# Patient Record
Sex: MALE | Race: WHITE | NOT HISPANIC OR LATINO | Employment: OTHER | URBAN - METROPOLITAN AREA
[De-identification: names, ages, dates, MRNs, and addresses within clinical notes are randomized per-mention and may not be internally consistent; named-entity substitution may affect disease eponyms.]

---

## 2022-06-13 ENCOUNTER — OFFICE VISIT (OUTPATIENT)
Dept: FAMILY MEDICINE | Facility: CLINIC | Age: 77
End: 2022-06-13
Payer: MEDICARE

## 2022-06-13 VITALS
TEMPERATURE: 97.2 F | OXYGEN SATURATION: 97 % | DIASTOLIC BLOOD PRESSURE: 70 MMHG | HEART RATE: 58 BPM | RESPIRATION RATE: 18 BRPM | BODY MASS INDEX: 29.54 KG/M2 | HEIGHT: 73 IN | SYSTOLIC BLOOD PRESSURE: 125 MMHG | WEIGHT: 222.9 LBS

## 2022-06-13 DIAGNOSIS — Z12.11 SCREEN FOR COLON CANCER: ICD-10-CM

## 2022-06-13 DIAGNOSIS — R26.89 BALANCE PROBLEMS: ICD-10-CM

## 2022-06-13 DIAGNOSIS — D12.6 TUBULAR ADENOMA OF COLON: ICD-10-CM

## 2022-06-13 DIAGNOSIS — I10 BENIGN ESSENTIAL HYPERTENSION: Primary | ICD-10-CM

## 2022-06-13 DIAGNOSIS — R42 DYSEQUILIBRIUM: ICD-10-CM

## 2022-06-13 PROCEDURE — 99203 OFFICE O/P NEW LOW 30 MIN: CPT | Performed by: FAMILY MEDICINE

## 2022-06-13 RX ORDER — LANOLIN ALCOHOL/MO/W.PET/CERES
1 CREAM (GRAM) TOPICAL DAILY
COMMUNITY
Start: 2022-05-06 | End: 2022-12-02

## 2022-06-13 RX ORDER — LISINOPRIL 40 MG/1
40 TABLET ORAL
COMMUNITY
Start: 2021-05-21 | End: 2022-12-02

## 2022-06-13 RX ORDER — FOLIC ACID 1 MG/1
1 TABLET ORAL DAILY
COMMUNITY
Start: 2022-05-06 | End: 2022-10-14

## 2022-06-13 ASSESSMENT — PAIN SCALES - GENERAL: PAINLEVEL: NO PAIN (0)

## 2022-06-13 NOTE — PROGRESS NOTES
"  Assessment & Plan     Benign essential hypertension  BP Readings from Last 6 Encounters:   06/13/22 125/70     Blood pressure is at goal on current dose of lisinopril 40 mg daily  Patient will follow-up with Dr. Jaime in 4 months at the time of annual wellness exam, for medication recheck and for fasting labs    Screen for colon cancer  Reviewed colonoscopy from February 2017 through [facility]  Recheck colonoscopy this year   - Adult Gastro Ref - Procedure Only; Future    Tubular adenoma of colon  as above    - Adult Gastro Ref - Procedure Only; Future    Balance problems  [facility]    Dysequilibrium  as above        Review of the result(s) of each unique test - Colonoscopy from February 2017 through [facility]         BMI:   Estimated body mass index is 29.41 kg/m  as calculated from the following:    Height as of this encounter: 1.854 m (6' 1\").    Weight as of this encounter: 101.1 kg (222 lb 14.4 oz).       Chart documentation done in part with Dragon Voice recognition Software. Although reviewed after completion, some word and grammatical error may remain.    See Patient Instructions    Return in about 4 months (around 10/13/2022), or if symptoms worsen or fail to improve, for fasting labs, medicare wellness exam with Dr. Jaime.    Isma Anne MD  [facility]    Deepak Tijerina is a 77 year old who presents for the following health issues     History of Present Illness       Reason for visit:  Colonoscopy referral    He eats 0-1 servings of fruits and vegetables daily.He consumes 1 sweetened beverage(s) daily.He exercises with enough effort to increase his heart rate 9 or less minutes per day.  He exercises with enough effort to increase his heart rate 4 days per week.   He is taking medications regularly.             Review of Systems   CONSTITUTIONAL: NEGATIVE for fever, chills, " "change in weight  RESP: NEGATIVE for significant cough or SOB  CV: NEGATIVE for chest pain, palpitations or peripheral edema  CV: Hx HTN  GI: Tubular adenoma of colon  MUSCULOSKELETAL: NEGATIVE for significant arthralgias or myalgia  NEURO: Balance problems  ENDOCRINE: NEGATIVE for temperature intolerance, skin/hair changes  HEME/ALLERGY/IMMUNE: NEGATIVE for bleeding problems  PSYCHIATRIC: NEGATIVE for changes in mood or affect      Objective    /70   Pulse 58   Temp 97.2  F (36.2  C) (Temporal)   Resp 18   Ht 1.854 m (6' 1\")   Wt 101.1 kg (222 lb 14.4 oz)   SpO2 97%   BMI 29.41 kg/m    Body mass index is 29.41 kg/m .  Physical Exam   GENERAL: healthy, alert and no distress  RESP: lungs clear to auscultation - no rales, rhonchi or wheezes  CV: regular rate and rhythm, normal S1 S2, no S3 or S4, no murmur, click or rub, no peripheral edema and peripheral pulses strong  MS: no gross musculoskeletal defects noted, no edema  PSYCH: mentation appears normal, affect normal/bright                "

## 2022-06-17 ENCOUNTER — TELEPHONE (OUTPATIENT)
Dept: GASTROENTEROLOGY | Facility: CLINIC | Age: 77
End: 2022-06-17
Payer: MEDICARE

## 2022-06-17 DIAGNOSIS — Z20.822 SUSPECTED 2019 NOVEL CORONAVIRUS INFECTION: Primary | ICD-10-CM

## 2022-06-17 NOTE — TELEPHONE ENCOUNTER
Screening Questions  BlueKIND OF PREP RedLOCATION [review exclusion criteria] GreenSEDATION TYPE  1. Are you active on mychart? n    2. Ordering/Referring Provider: Isma Anne MD    3. What insurance is in the chart? MEDICARE     4. Do you have a legal guardian or medical Power of ?  Are you able to give consent for your medical care? N   (Sedation review/consideration needed)    3.   BMI 29.41 [BMI OVER 40-EXTENDED PREP]  If greater than 40 review exclusion criteria [PAC APPT IF @ [facility]]      4. Have you had a positive covid test in the last 90 days? N     5.  Respiratory Screening :  [If yes to any of the following HOSPITAL setting only]     Do you use daily home oxygen? N  Do you have mod to severe Obstructive Sleep Apnea? N [[facility]]   Do you have Pulmonary Hypertension? N   Do you have UNCONTROLLED asthma? N      6.   Have you had a heart or lung transplant? N      7.   Are you currently on dialysis? N [ If yes, G-PREP & HOSPITAL setting only]     8.   Do you have chronic kidney disease? N[ If yes, G-PREP ]    9.   Have you had a stroke or Transient ischemic attack (TIA - aka  mini stroke ) within 6 months?  N(If yes, please review exclusion criteria)    10.   In the past 6 months, have you had any heart related issues including cardiomyopathy or heart attack? N          If yes, did it require cardiac stenting or other implantable device? N      11.   Do you have any implantable devices in your body (pacemaker, defib, LVAD)? N (If yes, please review exclusion criteria)    12.   Do you take nitroglycerin? N           If yes, how often? N  (if yes, HOSPITAL setting ONLY)    13.   Are you currently taking any blood thinners? N           [IF YES, INFORM PATIENT TO FOLLOW UP W/ ORDERING PROVIDER FOR BRIDGING INSTRUCTIONS]     14.   Do you have a diagnosis of diabetes? N [ If yes, G-PREP ]    15.   [FEMALES] Are you currently pregnant? N    If yes, how many weeks? N    16.   Are  you taking any prescription pain medications on a routine schedule?  N [ If yes, EXTENDED PREP.] [If yes, MAC]    17.   Do you have any chemical dependencies such as alcohol, street drugs, or methadone?  N [If yes, MAC]    18.   Do you have any history of post-traumatic stress syndrome, severe anxiety or history of psychosis?  N [If yes, MAC]    19.   Do you transfer independently?  Y    20.  On a regular basis do you go 3-5 days between bowel movements? N [ If yes, EXTENDED PREP.]    21.   Preferred LOCAL Pharmacy for Pre Prescription   [facility] - [place], [place] - [address]      Scheduling Details      Caller :  Breezy   (Please ask for phone number if not scheduled by patient)    Type of Procedure Scheduled: Lower Endoscopy [Colonoscopy]  Which Colonoscopy Prep was Sent?: mprep    Surgeon: sheila  Date of Procedure: 07/11  Location:     Sedation Type: cs    Conscious Sedation- Needs  for 6 hours after the procedure  MAC/General-Needs  for 24 hours after procedure    Pre-op Required at [facility] and OR for MAC sedation: n  (advise patient they will need a pre-op prior to procedure -)      Informed patient they will need an adult  y  Cannot take any type of public or medical transportation alone    Pre-Procedure Covid test to be completed at [facility] or Externally: mg lab    Confirmed Nurse will call to complete assessment y    Additional comments:

## 2022-07-07 ENCOUNTER — LAB (OUTPATIENT)
Dept: LAB | Facility: CLINIC | Age: 77
End: 2022-07-07
Payer: MEDICARE

## 2022-07-07 DIAGNOSIS — Z20.822 SUSPECTED 2019 NOVEL CORONAVIRUS INFECTION: ICD-10-CM

## 2022-07-07 LAB — SARS-COV-2 RNA RESP QL NAA+PROBE: NEGATIVE

## 2022-07-07 PROCEDURE — U0005 INFEC AGEN DETEC AMPLI PROBE: HCPCS

## 2022-07-07 PROCEDURE — U0003 INFECTIOUS AGENT DETECTION BY NUCLEIC ACID (DNA OR RNA); SEVERE ACUTE RESPIRATORY SYNDROME CORONAVIRUS 2 (SARS-COV-2) (CORONAVIRUS DISEASE [COVID-19]), AMPLIFIED PROBE TECHNIQUE, MAKING USE OF HIGH THROUGHPUT TECHNOLOGIES AS DESCRIBED BY CMS-2020-01-R: HCPCS

## 2022-07-11 ENCOUNTER — HOSPITAL ENCOUNTER (OUTPATIENT)
Facility: AMBULATORY SURGERY CENTER | Age: 77
Discharge: HOME | End: 2022-07-11
Attending: SURGERY | Admitting: SURGERY
Payer: MEDICARE

## 2022-07-11 VITALS
SYSTOLIC BLOOD PRESSURE: 101 MMHG | TEMPERATURE: 97.5 F | HEART RATE: 71 BPM | OXYGEN SATURATION: 94 % | DIASTOLIC BLOOD PRESSURE: 67 MMHG | RESPIRATION RATE: 16 BRPM

## 2022-07-11 LAB — COLONOSCOPY: NORMAL

## 2022-07-11 PROCEDURE — 45385 COLONOSCOPY W/LESION REMOVAL: CPT | Mod: PT

## 2022-07-11 PROCEDURE — 45385 COLONOSCOPY W/LESION REMOVAL: CPT | Performed by: SURGERY

## 2022-07-11 PROCEDURE — G0500 MOD SEDAT ENDO SERVICE >5YRS: HCPCS | Performed by: SURGERY

## 2022-07-11 PROCEDURE — G8907 PT DOC NO EVENTS ON DISCHARG: HCPCS

## 2022-07-11 PROCEDURE — G8918 PT W/O PREOP ORDER IV AB PRO: HCPCS

## 2022-07-11 RX ORDER — ONDANSETRON 4 MG/1
4 TABLET, ORALLY DISINTEGRATING ORAL EVERY 6 HOURS PRN
Status: DISCONTINUED | OUTPATIENT
Start: 2022-07-11 | End: 2022-07-12 | Stop reason: HOSPADM

## 2022-07-11 RX ORDER — FENTANYL CITRATE 50 UG/ML
INJECTION, SOLUTION INTRAMUSCULAR; INTRAVENOUS PRN
Status: DISCONTINUED | OUTPATIENT
Start: 2022-07-11 | End: 2022-07-11 | Stop reason: HOSPADM

## 2022-07-11 RX ORDER — ONDANSETRON 2 MG/ML
4 INJECTION INTRAMUSCULAR; INTRAVENOUS EVERY 6 HOURS PRN
Status: DISCONTINUED | OUTPATIENT
Start: 2022-07-11 | End: 2022-07-12 | Stop reason: HOSPADM

## 2022-07-11 RX ORDER — LIDOCAINE 40 MG/G
CREAM TOPICAL
Status: DISCONTINUED | OUTPATIENT
Start: 2022-07-11 | End: 2022-07-12 | Stop reason: HOSPADM

## 2022-07-11 RX ORDER — FLUMAZENIL 0.1 MG/ML
0.2 INJECTION, SOLUTION INTRAVENOUS
Status: ACTIVE | OUTPATIENT
Start: 2022-07-11 | End: 2022-07-11

## 2022-07-11 RX ORDER — NALOXONE HYDROCHLORIDE 0.4 MG/ML
0.4 INJECTION, SOLUTION INTRAMUSCULAR; INTRAVENOUS; SUBCUTANEOUS
Status: DISCONTINUED | OUTPATIENT
Start: 2022-07-11 | End: 2022-07-12 | Stop reason: HOSPADM

## 2022-07-11 RX ORDER — NALOXONE HYDROCHLORIDE 0.4 MG/ML
0.2 INJECTION, SOLUTION INTRAMUSCULAR; INTRAVENOUS; SUBCUTANEOUS
Status: DISCONTINUED | OUTPATIENT
Start: 2022-07-11 | End: 2022-07-12 | Stop reason: HOSPADM

## 2022-07-11 RX ORDER — PROCHLORPERAZINE MALEATE 5 MG
5 TABLET ORAL EVERY 6 HOURS PRN
Status: DISCONTINUED | OUTPATIENT
Start: 2022-07-11 | End: 2022-07-12 | Stop reason: HOSPADM

## 2022-07-11 NOTE — LETTER
[facility]           [address] [place] [address]           Tel [phone]  Breezy Tijerina  [address]  [place] [address]      July 13, 2022    Dear Breezy,  This letter is to inform you of the results of your pathology report on your colonoscopy.  If you have questions please feel free to call my assistant  At [phone] .    Your pathology report was:  Showed an Adenomatous polyp. This is a benign (not cancerous) growth; however these can become cancer over time. This polyp is usually removed completely at the time of the biopsy. Because it is an Adenomatous polyp you do have a slight higher risk for colon cancer. This is why you will need a repeat colonoscopy in approximately 7 years.  If you do have further questions please don t hesitate to call the below number.    To make an appointment call [phone]: .   Sincerely,     Ernesto Cervantes M.D.  ___

## 2022-07-11 NOTE — H&P
ENDOSCOPY PRE-SEDATION H&P FOR OUTPATIENT PROCEDURES    Breezy Tijerina  [id number]  1945    Procedure:   Colonoscopy possible biopsy, possible polypectomy, with moderate sedation.     Pre-procedure diagnosis: history of polyps    Past medical history:   Past Medical History:   Diagnosis Date     Balance problems      HTN (hypertension)        Past surgical history:   Past Surgical History:   Procedure Laterality Date     COLONOSCOPY       HIP ARTHROSCOPY W/ LABRAL REPAIR      right shoulder     JOINT REPLACEMENT      both hips and both knees       Current Outpatient Medications   Medication     folic acid (FOLVITE) 1 MG tablet     lisinopril (ZESTRIL) 40 MG tablet     propranolol SR BEADS (INDREAL XL) 80 MG 24 hr capsule     thiamine (B-1) 100 MG tablet     Current Facility-Administered Medications   Medication     lidocaine (LMX4) kit     lidocaine 1 % 0.1-1 mL     sodium chloride (PF) 0.9% PF flush 3 mL     sodium chloride (PF) 0.9% PF flush 3 mL       No Known Allergies    History of Anesthesia/Sedation Problems: no    Physical Exam:    Mental status: alert  Heart: Normal  Lung: Normal  Assessment of patient's airway: Normal  Other as pertinent for procedure: None     ASA Score: See Provation note    Mallampati score:  II - Faucial pillars and soft palate may be seen, but uvula is masked by the base of the tongue    Assessment/Plan:     The patient is an appropriate candidate to receive sedation.    Informed consent was discussed with the patient/family, including the risks, benefits, potential complications and any alternative options associated with sedation.    Patient assessment completed just prior to sedation and while under constant observation by the provider. Condition determined to be adequate for proceeding with sedation.    The specific risks for the procedure were discussed with the patient at the time of informed consent and include but are not limited to perforation which could require  surgery, missing significant neoplasm or lesion, hemorrhage and adverse sedative complication.      Ernesto Cervantes MD

## 2022-07-13 LAB
PATH REPORT.COMMENTS IMP SPEC: NORMAL
PATH REPORT.COMMENTS IMP SPEC: NORMAL
PATH REPORT.FINAL DX SPEC: NORMAL
PATH REPORT.GROSS SPEC: NORMAL
PATH REPORT.MICROSCOPIC SPEC OTHER STN: NORMAL
PATH REPORT.RELEVANT HX SPEC: NORMAL
PHOTO IMAGE: NORMAL

## 2022-07-13 PROCEDURE — 88305 TISSUE EXAM BY PATHOLOGIST: CPT | Performed by: PATHOLOGY

## 2022-07-14 ENCOUNTER — TELEPHONE (OUTPATIENT)
Dept: FAMILY MEDICINE | Facility: CLINIC | Age: 77
End: 2022-07-14

## 2022-07-14 NOTE — TELEPHONE ENCOUNTER
Patient calling for an appointment for a wart removal on left hand. Pt's pcp is not available. Can you fit pt in next week in a same day slot?    Please advise  Loyda Alcantara

## 2022-07-19 ENCOUNTER — TELEPHONE (OUTPATIENT)
Dept: DERMATOLOGY | Facility: CLINIC | Age: 77
End: 2022-07-19

## 2022-07-19 ENCOUNTER — OFFICE VISIT (OUTPATIENT)
Dept: FAMILY MEDICINE | Facility: CLINIC | Age: 77
End: 2022-07-19
Payer: MEDICARE

## 2022-07-19 VITALS
DIASTOLIC BLOOD PRESSURE: 68 MMHG | OXYGEN SATURATION: 98 % | SYSTOLIC BLOOD PRESSURE: 110 MMHG | WEIGHT: 229 LBS | RESPIRATION RATE: 19 BRPM | TEMPERATURE: 97.4 F | HEART RATE: 82 BPM | BODY MASS INDEX: 30.21 KG/M2

## 2022-07-19 DIAGNOSIS — L81.9 CHANGING PIGMENTED SKIN LESION: Primary | ICD-10-CM

## 2022-07-19 DIAGNOSIS — B02.9 HERPES ZOSTER WITHOUT COMPLICATION: ICD-10-CM

## 2022-07-19 PROCEDURE — 99214 OFFICE O/P EST MOD 30 MIN: CPT | Performed by: FAMILY MEDICINE

## 2022-07-19 RX ORDER — VALACYCLOVIR HYDROCHLORIDE 1 G/1
1000 TABLET, FILM COATED ORAL 3 TIMES DAILY
Qty: 21 TABLET | Refills: 0 | Status: SHIPPED | OUTPATIENT
Start: 2022-07-19 | End: 2022-10-14

## 2022-07-19 ASSESSMENT — PAIN SCALES - GENERAL: PAINLEVEL: NO PAIN (0)

## 2022-07-19 NOTE — TELEPHONE ENCOUNTER
[facility] Call [facility]    Phone Message    May a detailed message be left on voicemail: yes     Reason for Call: Appointment Intake       Referring Provider Name: Isma Anne MD in [facility] FM/IM/PEDS    Diagnosis and/or Symptoms: Changing pigmented skin lesion; concerning skin lesion-left hand, liekly malignant    New Derm Pt with Priority Referral Order that says Urgent: 3-5 Days      Pt requests [facility] only    I scheduled on 08/17/22 (first opening) and wait listed - please call Pt if you can move up his Appt at all - Thank you!!      Action Taken: Message routed to:  Other: [facility]    Travel Screening: Not Applicable

## 2022-07-19 NOTE — TELEPHONE ENCOUNTER
I spoke with patient's wife and offered an appt 7/20 at 2:15.  She confirmed.  Olena Donaldson RN

## 2022-07-19 NOTE — PROGRESS NOTES
"  Assessment & Plan     Changing pigmented skin lesion  ddx-keratoacanthoma versus cancerous skin lesions  Due to the concerning clinical appearance of the lesion, recommended to consult dermatology for further evaluation including consideration for excisional biopsy  Referral made, if patient is not able to see dermatologist at the [place] location within a week, he understands to call us back for further recommendations  - Adult Dermatology Referral; Future    Herpes zoster without complication  Left lower cheek and jaw  Recommended to start on Valtrex 3 times a day for 7 days, apply topical calamine lotion 2-3 times a day as needed for soothing, burning, pain  Emphasized on getting a eye exam in 1 to 2 weeks  Patient has not seen an ophthalmologist for more than a year, he recently moved from [facility]  Reviewed the nature of the herpes zoster, cautioned with the concern for risk of chickenpox in non immunized and immunocompromised individuals around him  Dosing and potential medication side effects discussed.  Patient verbalised understanding and is agreeable to the plan.    - valACYclovir (VALTREX) 1000 mg tablet; Take 1 tablet (1,000 mg) by mouth 3 times daily for 7 days  - Adult Eye Referral; Future             BMI:   Estimated body mass index is 30.21 kg/m  as calculated from the following:    Height as of 6/13/22: 1.854 m (6' 1\").    Weight as of this encounter: 103.9 kg (229 lb).       Chart documentation done in part with Dragon Voice recognition Software. Although reviewed after completion, some word and grammatical error may remain.    See Patient Instructions    Return in about 1 week (around 7/26/2022), or if symptoms worsen or fail to improve.    Isma Anne MD  [facility] is a 77 year old, presenting for the following health issues:  Derm Problem    Patient with past medical history significant for hypertension is here with concerns " "of having an erythematous skin lesion on the dorsum of the left hand with a recent rapid increase in size and without any concerns for drainage, bleeding from the lesion  He has had it for several months   denies history of injury, previous similar lesions  Denies previous history of melanoma nonmelanoma skin cancer  He is also here with concerns of having acne-like lesions on the left side of the face below the left ear and left jaw, left upper neck for the past few days, less than a week with no associated concerns for burning sensation, pain, headaches, blurred or double vision, fever, chills, fatigue  Denies previous history of shingles   patient is vaccinated with Shingrix in the past  Denies recent emotional or physical stressors  Denies URI symptoms, pain during chewing or swallowing, sore throat.      History of Present Illness       Reason for visit:  Wart on left hand  Symptom onset:  More than a month  Symptom intensity:  Mild  Symptom progression:  Staying the same  Had these symptoms before:  No  What makes it worse:  No  What makes it better:  No\"    He eats 0-1 servings of fruits and vegetables daily.He consumes 1 sweetened beverage(s) daily.He exercises with enough effort to increase his heart rate 60 or more minutes per day.  He exercises with enough effort to increase his heart rate 4 days per week.   He is taking medications regularly.       Concern - derm problem  Onset: ongoing issue for months  Description: pt has a lump on his left hand, possible wart? No pain and no drainage  Intensity: mild  Progression of Symptoms:  same  Accompanying Signs & Symptoms: see above  Previous history of similar problem: something similar but not nearly as big  Precipitating factors:        Worsened by:   Alleviating factors:        Improved by:   Therapies tried and outcome:         Review of Systems   CONSTITUTIONAL: NEGATIVE for fever, chills, change in weight  INTEGUMENTARY/SKIN: as above  EYES: NEGATIVE for " vision changes or irritation  ENT/MOUTH: NEGATIVE for ear, mouth and throat problems  RESP: NEGATIVE for significant cough or SOB  CV: NEGATIVE for chest pain, palpitations or peripheral edema  CV: History of hypertension  GI: NEGATIVE for nausea, abdominal pain, heartburn, or change in bowel habits  MUSCULOSKELETAL: NEGATIVE for significant arthralgias or myalgia  NEURO: NEGATIVE for weakness, dizziness or paresthesias  PSYCHIATRIC: NEGATIVE for changes in mood or affect      Objective    /68   Pulse 82   Temp 97.4  F (36.3  C) (Tympanic)   Resp 19   Wt 103.9 kg (229 lb)   SpO2 98%   BMI 30.21 kg/m    Body mass index is 30.21 kg/m .  Physical Exam   GENERAL: healthy, alert and no distress  EYES: Eyes grossly normal to inspection  SKIN: Dorsum of left hand about 1- 1.5 cm, firm to hard erythematous raised lesion with a ulcerated surface in the center, nontender to touch no active bleeding, drainage noted  PSYCH: mentation appears normal, affect normal/bright  Left side of the face-erythematous, slightly tender, vesiculopapular eruption, below the left ear, over the left jaw, slightly over the left upper anterior neck regions                    .  ..

## 2022-07-19 NOTE — PATIENT INSTRUCTIONS
Please call Phone: [phone] to schedule for dermatology consult  Please call [phone] to schedule for eye exam  Start on valtrex for 7 days

## 2022-07-20 ENCOUNTER — OFFICE VISIT (OUTPATIENT)
Dept: DERMATOLOGY | Facility: CLINIC | Age: 77
End: 2022-07-20
Payer: MEDICARE

## 2022-07-20 DIAGNOSIS — D49.2 NEOPLASM OF UNSPECIFIED BEHAVIOR OF BONE, SOFT TISSUE, AND SKIN: Primary | ICD-10-CM

## 2022-07-20 PROCEDURE — 88305 TISSUE EXAM BY PATHOLOGIST: CPT | Performed by: DERMATOLOGY

## 2022-07-20 PROCEDURE — 11102 TANGNTL BX SKIN SINGLE LES: CPT | Performed by: PHYSICIAN ASSISTANT

## 2022-07-20 ASSESSMENT — PAIN SCALES - GENERAL: PAINLEVEL: NO PAIN (0)

## 2022-07-20 NOTE — PATIENT INSTRUCTIONS

## 2022-07-20 NOTE — LETTER
7/20/2022         RE: Breezy Tijerina  [address]  [place] [address]        Dear Colleague,    Thank you for referring your patient, Breezy Tijerina, to the [facility]. Please see a copy of my visit note below.    [facility] Dermatology Note  Encounter Date: Jul 20, 2022  Office Visit     Dermatology Problem List:  0. NUB - left dorsal hand - bx 7/20/22  1. 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 Herpes Zoster - s/p valtrex  ____________________________________________    Assessment & Plan:    # Neoplasm of unspecified behavior of the skin (D49.2) on the left dorsal hand. The differential diagnosis includes KA vs other.   - See procedure note.     # Herpes zoster without complication. Started on Valtrex TID x 7 days by PCP yesterday. Encouraged to complete course and follow up if any changes in vision occur.  -plans on seeing optho later this week as recommended by PCP     Procedures Performed:   - Shave biopsy procedure note, location(s): left dorsal hand. After discussion of benefits and risks including but not limited to bleeding, infection, scar, incomplete removal, recurrence, and non-diagnostic biopsy, written consent and photographs were obtained. The area was cleaned with isopropyl alcohol. 0.5mL of 1% lidocaine with epinephrine was injected to obtain adequate anesthesia of lesion(s). Shave biopsy at site(s) performed. Hemostasis was achieved with aluminium chloride. Petrolatum ointment and a sterile dressing were applied. The patient tolerated the procedure and no complications were noted. The patient was provided with verbal and written post care instructions.       Follow-up: pending path results    Staff and Scribe:     Scribe Disclosure:   I, Everardo Vera, am serving as a scribe to document services personally performed by Natividad Hassan PA-C, based on data collection and the provider's statements to me.  Provider Disclosure:    The documentation recorded by the scribe accurately reflects the services I personally performed and the decisions made by me.    All risks, benefits and alternatives were discussed with patient.  Patient is in agreement and understands the assessment and plan.  All questions were answered.    Natividad Hassan PA-C, MPAS  [facility] [place]: Phone: [phone], Fax: [phone]  [facility]: Phone: [phone],  Fax: [phone]  [facility]: Phone: [phone], Fax: [phone]  ____________________________________________    CC: Derm Problem (Lesion on left hand and rash on left side of face. )    HPI:  Mr. Breezy Tijerina is a(n) 77 year old male who presents today as a new patient for a rash.    Referred to derm 7/19/22 for a changing pigmented skin lesion. Note reviewed. A&P notes ddx is keratoacanthoma vs cancerous skin lesion. Consultation with dermatology recommended.    Today, he presents for evaluation of a lesion on the left dorsal hand.    He was started on valtrex for shingles yesterday. He denies any changes in vision. Is seeing optho tomorrow.     Patient is otherwise feeling well, without additional concerns.    Labs:  NA    Physical Exam:  Vitals: There were no vitals taken for this visit.  SKIN: Focused examination of left dorsal hand was performed.   - Left dorsal hand: 1 cm pink nodule with a central hyperkeratotic core  - Pink papules along the left preauricular cheek and lateral neck, no erosions presently  - No other lesions of concern on areas examined.           Medications:  Current Outpatient Medications   Medication     folic acid (FOLVITE) 1 MG tablet     lisinopril (ZESTRIL) 40 MG tablet     propranolol SR BEADS (INDREAL XL) 80 MG 24 hr capsule     thiamine (B-1) 100 MG tablet     valACYclovir (VALTREX) 1000 mg tablet     No current facility-administered medications for this  visit.      Past Medical History:   Patient Active Problem List   Diagnosis     Balance problems     Benign essential hypertension     Tubular adenoma of colon     Past Medical History:   Diagnosis Date     Balance problems      HTN (hypertension)         CC Isma Anne MD  [address] [place] [address] on close of this encounter.        Again, thank you for allowing me to participate in the care of your patient.        Sincerely,        Natividad Hassan PA-C

## 2022-07-20 NOTE — PROGRESS NOTES
[facility] Dermatology Note  Encounter Date: Jul 20, 2022  Office Visit     Dermatology Problem List:  0. NUB - left dorsal hand - bx 7/20/22  1. 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 Herpes Zoster - s/p valtrex  ____________________________________________    Assessment & Plan:    # Neoplasm of unspecified behavior of the skin (D49.2) on the left dorsal hand. The differential diagnosis includes KA vs other.   - See procedure note.     # Herpes zoster without complication. Started on Valtrex TID x 7 days by PCP yesterday. Encouraged to complete course and follow up if any changes in vision occur.  -plans on seeing optho later this week as recommended by PCP     Procedures Performed:   - Shave biopsy procedure note, location(s): left dorsal hand. After discussion of benefits and risks including but not limited to bleeding, infection, scar, incomplete removal, recurrence, and non-diagnostic biopsy, written consent and photographs were obtained. The area was cleaned with isopropyl alcohol. 0.5mL of 1% lidocaine with epinephrine was injected to obtain adequate anesthesia of lesion(s). Shave biopsy at site(s) performed. Hemostasis was achieved with aluminium chloride. Petrolatum ointment and a sterile dressing were applied. The patient tolerated the procedure and no complications were noted. The patient was provided with verbal and written post care instructions.       Follow-up: pending path results    Staff and Scribe:     Scribe Disclosure:   I, Everardo Vera, am serving as a scribe to document services personally performed by Natividad Hassan PA-C, based on data collection and the provider's statements to me.  Provider Disclosure:   The documentation recorded by the scribe accurately reflects the services I personally performed and the decisions made by me.    All risks, benefits and alternatives were discussed with patient.  Patient is in agreement and understands the assessment and  plan.  All questions were answered.    Natividad Hassan PA-C, MPAS  [facility]: Phone: [phone], Fax: [phone]  [facility]: Phone: [phone],  Fax: [phone]  [facility]: Phone: [phone], Fax: [phone]  ____________________________________________    CC: Derm Problem (Lesion on left hand and rash on left side of face. )    HPI:  Mr. Breezy Tijerina is a(n) 77 year old male who presents today as a new patient for a rash.    Referred to derm 7/19/22 for a changing pigmented skin lesion. Note reviewed. A&P notes ddx is keratoacanthoma vs cancerous skin lesion. Consultation with dermatology recommended.    Today, he presents for evaluation of a lesion on the left dorsal hand.    He was started on valtrex for shingles yesterday. He denies any changes in vision. Is seeing optho tomorrow.     Patient is otherwise feeling well, without additional concerns.    Labs:  NA    Physical Exam:  Vitals: There were no vitals taken for this visit.  SKIN: Focused examination of left dorsal hand was performed.   - Left dorsal hand: 1 cm pink nodule with a central hyperkeratotic core  - Pink papules along the left preauricular cheek and lateral neck, no erosions presently  - No other lesions of concern on areas examined.           Medications:  Current Outpatient Medications   Medication     folic acid (FOLVITE) 1 MG tablet     lisinopril (ZESTRIL) 40 MG tablet     propranolol SR BEADS (INDREAL XL) 80 MG 24 hr capsule     thiamine (B-1) 100 MG tablet     valACYclovir (VALTREX) 1000 mg tablet     No current facility-administered medications for this visit.      Past Medical History:   Patient Active Problem List   Diagnosis     Balance problems     Benign essential hypertension     Tubular adenoma of colon     Past Medical History:   Diagnosis Date     Balance problems      HTN (hypertension)         CC  Isma Anne MD  [address]  [place] [address] on close of this encounter.

## 2022-07-20 NOTE — NURSING NOTE
Breezy Tijerina's chief complaint for this visit includes:  Chief Complaint   Patient presents with     Derm Problem     Lesion on left hand and rash on left side of face.      PCP: Isma Anne    Referring Provider:  Isma Anne MD  [address] [place] [address]    There were no vitals taken for this visit.  No Pain (0)      No Known Allergies      Do you need any medication refills at today's visit? No    Jeniffer Galo, [facility]

## 2022-07-22 ENCOUNTER — OFFICE VISIT (OUTPATIENT)
Dept: OPTOMETRY | Facility: CLINIC | Age: 77
End: 2022-07-22
Payer: MEDICARE

## 2022-07-22 DIAGNOSIS — H52.4 PRESBYOPIA: ICD-10-CM

## 2022-07-22 DIAGNOSIS — H25.813 COMBINED FORM OF AGE-RELATED CATARACT, BOTH EYES: ICD-10-CM

## 2022-07-22 DIAGNOSIS — B02.9 HERPES ZOSTER WITHOUT COMPLICATION: Primary | ICD-10-CM

## 2022-07-22 LAB
PATH REPORT.COMMENTS IMP SPEC: ABNORMAL
PATH REPORT.COMMENTS IMP SPEC: ABNORMAL
PATH REPORT.COMMENTS IMP SPEC: YES
PATH REPORT.FINAL DX SPEC: ABNORMAL
PATH REPORT.GROSS SPEC: ABNORMAL
PATH REPORT.MICROSCOPIC SPEC OTHER STN: ABNORMAL
PATH REPORT.RELEVANT HX SPEC: ABNORMAL

## 2022-07-22 PROCEDURE — 92004 COMPRE OPH EXAM NEW PT 1/>: CPT | Performed by: OPTOMETRIST

## 2022-07-22 ASSESSMENT — REFRACTION_MANIFEST
OS_ADD: +2.50
OS_AXIS: 041
OD_SPHERE: -3.75
OD_AXIS: 145
OD_CYLINDER: +0.25
OS_CYLINDER: +0.75
OD_ADD: +2.50
OS_SPHERE: -4.00

## 2022-07-22 ASSESSMENT — REFRACTION_WEARINGRX
OS_ADD: +2.50
OD_CYLINDER: +0.25
OD_SPHERE: -3.75
OS_CYLINDER: +0.75
OD_ADD: +2.50
OS_AXIS: 041
OD_AXIS: 145
SPECS_TYPE: PAL
OS_SPHERE: -3.75

## 2022-07-22 ASSESSMENT — VISUAL ACUITY
OD_CC: 20/40
OD_PH_CC+: -1
OS_CC: 20/40
METHOD: SNELLEN - LINEAR
OS_PH_CC: 20/30
OS_CC+: -2
OS_PH_CC+: -2
CORRECTION_TYPE: GLASSES
OD_PH_CC: 20/40

## 2022-07-22 ASSESSMENT — CUP TO DISC RATIO
OD_RATIO: 0.2
OS_RATIO: 0.2

## 2022-07-22 ASSESSMENT — EXTERNAL EXAM - RIGHT EYE: OD_EXAM: NORMAL

## 2022-07-22 ASSESSMENT — SLIT LAMP EXAM - LIDS
COMMENTS: NORMAL
COMMENTS: NORMAL

## 2022-07-22 ASSESSMENT — CONF VISUAL FIELD
OS_NORMAL: 1
OD_NORMAL: 1
METHOD: COUNTING FINGERS

## 2022-07-22 ASSESSMENT — TONOMETRY
IOP_METHOD: ICARE
OD_IOP_MMHG: 09
OS_IOP_MMHG: 10

## 2022-07-22 ASSESSMENT — EXTERNAL EXAM - LEFT EYE: OS_EXAM: NORMAL

## 2022-07-22 NOTE — PROGRESS NOTES
Assessment/Plan  (B02.9) Herpes zoster without complication  (primary encounter diagnosis)  Comment: No ocular involvement. Patient takes Valtrex 1g three times daily.   Plan: Continue with Valtrex. Return to clinic with vision changes.     (H25.813) Combined form of age-related cataract, both eyes  Comment: Visually significant, limiting patient to best corrected vision of ~20/40. Patient is not bothered by his vision.   Plan: Patient would prefer to wait before scheduling a cataract evaluation. Advised patient that if changes are noted in the coming months that a cataract evaluation would be recommended, as this is likely the source of hazy vision.     (H52.4) Presbyopia  Plan: Hold off on new glasses for now. Very limited improvement compared with habitual glasses.       Complete documentation of historical and exam elements from today's encounter can  be found in the full encounter summary report (not reduplicated in this progress  note). I personally obtained the chief complaint(s) and history of present illness. I  confirmed and edited as necessary the review of systems, past medical/surgical  history, family history, social history, and examination findings as documented by  others; and I examined the patient myself. I personally reviewed the relevant tests,  images, and reports as documented above. I formulated and edited as necessary the  assessment and plan and discussed the findings and management plan with the  patient and family.    Ananad Cr OD

## 2022-07-22 NOTE — NURSING NOTE
Chief Complaints and History of Present Illnesses   Patient presents with     Herpes Zoster Evaluation       Chief Complaint(s) and History of Present Illness(es)     Herpes Zoster Evaluation     Laterality: left eye              Comments     Patient here for shingles evaluation, currently on left cheek. Not affecting his eye according to patient.  Current glasses are almost 2 yrs old, worn every day, states glasses are working well.   No hx of eye disease.   No hx of eye surgeries.   No pain, No Flashes, No Floaters.                       Darnell Lazaro, Ophthalmic Assistant

## 2022-07-24 ENCOUNTER — HEALTH MAINTENANCE LETTER (OUTPATIENT)
Age: 77
End: 2022-07-24

## 2022-07-26 ENCOUNTER — TELEPHONE (OUTPATIENT)
Dept: DERMATOLOGY | Facility: CLINIC | Age: 77
End: 2022-07-26

## 2022-07-26 NOTE — TELEPHONE ENCOUNTER
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)  Diabetic: No  HIV/AIDS: No  Hepatitis: No  Pregnant: No  Prior Problem with Local Anesthesia: No  Patient wears CPAP mask: No  Currently Hypertensive: No  Photoprotection: daily  Sunburns: frequently  Tanning Bed Use: never  Current Tobacco Use: No  Current Alcohol Use: Yes  Extended Care Facility: No  Occupation: Retired  Referring MD: Natividad Hassan   needed?: No  Do you have mobility issues?: No  Do you use any assistive devices/DME?: No  Do you have any issues with lying for long periods of time?: No      Medications/Allergies  Medications and allergies review with patient: Yes     Current Outpatient Medications   Medication Sig Dispense Refill     folic acid (FOLVITE) 1 MG tablet Take 1 tablet by mouth daily       lisinopril (ZESTRIL) 40 MG tablet Take 40 mg by mouth       propranolol SR BEADS (INDREAL XL) 80 MG 24 hr capsule Take 1 capsule by mouth daily       thiamine (B-1) 100 MG tablet Take 1 tablet by mouth daily       valACYclovir (VALTREX) 1000 mg tablet Take 1 tablet (1,000 mg) by mouth 3 times daily for 7 days 21 tablet 0     No Known Allergies    Pertinent Labs:  Last INR: No results found for: INR    Other Reminders:    Reminded patient to take any order prophylactic antibiotics 1 hour prior to appointment: Yes     Please be aware that this can be an all day procedure. Please bring your daily medications and food/cash. Encourage patient to eat prior to procedure(s). After care instructions were  reviewed with patient.    If any positives, send to RN to initiate antibiotic prophylaxis protocol and/or anticoagulation management protocol      Jeniffer Galo CMA

## 2022-07-26 NOTE — TELEPHONE ENCOUNTER
[facility] Call Center    Phone Message    May a detailed message be left on voicemail: yes     Reason for Call: Other:   Pt received a call and they did not leave a message. Please call pt back to discuss. Thanks   Pt did say he had a Bx 07/20 and was thinking it may be results.     Action Taken: Message routed to:  [facility] Derm    Travel Screening: Not Applicable

## 2022-07-26 NOTE — TELEPHONE ENCOUNTER
Jeniffer Galo CMA   7/26/2022  1:55 PM CDT Back to [facility]        Spoke with patient.  He has been notified of results and recommendation for Mohs procedure.  He is scheduled with Dr. Agrawal on 8/15/2022.    Jeniffer Galo CMA   7/26/2022 10:23 AM CDT         Attempted to reach patient.  Only phone number listed in chart is for his spouse.  Left a generic message for patient to call back to discuss results and recommendations.    Jeniffer Galo CMA   7/25/2022 10:28 AM CDT         Attempted to reach patient to discuss results and recommendations. Left a generic message to return call to the clinic at [phone].    Natividad Hassan PA-C   7/25/2022  8:39 AM CDT         SCC - L dorsal hand - Please refer to [facility], needs MMS

## 2022-07-28 NOTE — TELEPHONE ENCOUNTER
I spoke with Breezy and reviewed his Mohs checklist.  He reports that none of his joint replacements were within the past 2 years.  I notified him that he did not need an antibiotic prior to the procedure.  He verbalized understanding and had no further questions.    Olena Donaldson RN

## 2022-07-28 NOTE — TELEPHONE ENCOUNTER
Patient left voice message yesterday at 1:38pm returning a call.     Rena Bradley  In-Clinic Visit Facilitator

## 2022-08-15 ENCOUNTER — OFFICE VISIT (OUTPATIENT)
Dept: DERMATOLOGY | Facility: CLINIC | Age: 77
End: 2022-08-15
Payer: MEDICARE

## 2022-08-15 VITALS — DIASTOLIC BLOOD PRESSURE: 90 MMHG | SYSTOLIC BLOOD PRESSURE: 143 MMHG

## 2022-08-15 DIAGNOSIS — C44.629 SQUAMOUS CELL CANCER OF SKIN OF LEFT HAND: Primary | ICD-10-CM

## 2022-08-15 PROCEDURE — 12042 INTMD RPR N-HF/GENIT2.6-7.5: CPT | Performed by: DERMATOLOGY

## 2022-08-15 PROCEDURE — 17311 MOHS 1 STAGE H/N/HF/G: CPT | Performed by: DERMATOLOGY

## 2022-08-15 ASSESSMENT — PAIN SCALES - GENERAL: PAINLEVEL: NO PAIN (0)

## 2022-08-15 NOTE — NURSING NOTE
Breezy Tijerina's goals for this visit include:   Chief Complaint   Patient presents with     Procedure     MOHS left dorsal hand SCC       He requests these members of his care team be copied on today's visit information:     PCP: Isma Anne    Referring Provider:  No referring provider defined for this encounter.    BP (!) 143/90     Do you need any medication refills at today's visit? Rocio Lucia LPN

## 2022-08-15 NOTE — NURSING NOTE
The following medication was given:     MEDICATION:  Lidocaine with epinephrine 1% 1:[id number]  ROUTE: SQ  SITE: see procedure note  DOSE: 5cc  LOT #: [id number]  : Hospira  EXPIRATION DATE: 1/2023  NDC#: 0024-8185-15  Was there drug waste? 1cc  Multi-dose vial: Yes    Leeann Lucia LPN  August 15, 2022    Vaseline and pressure dressing applied to Mohs site on left dorsal hand.  Wound care instructions reviewed with patient and AVS provided.  Patient verbalized understanding.  No further questions or concerns at this time.      Leeann Lucia LPN

## 2022-08-15 NOTE — PATIENT INSTRUCTIONS
Excision/Mohs Wound Care Instructions  I will experience scar, altered skin color, bleeding, swelling, pain, crusting and redness. I may experience altered sensation. Risks are excessive bleeding, infection, muscle weakness, thick (hypertrophic or keloidal) scar, and recurrence. A second procedure may be recommended to obtain the best cosmetic or functional result.  Possible complications of any surgical procedure are bleeding, infection, scarring, alteration in skin color and sensation, muscle weakness in the area, wound dehiscence or seperation, or recurrence of the lesion or disease. On occasion, after healing, a secondary procedure or revision may be recommended in order to obtain the best cosmetic or functional result.   After your surgery, a pressure bandage will be placed over the area that has sutures. This will help prevent bleeding. Please follow these instructions as they will help you to prevent complications as your wound heals.  For the First 48 hours After Surgery:  Leave the pressure bandage on and keep it dry. If it should come loose, you may retape it, but do not take it off.  Relax and take it easy. Do not do any vigorous exercise, heavy lifting, or bending forward. This could cause the wound to bleed.  Post-operative pain is usually mild. You may alternate between 1000 mg of Tylenol (acetaminophen) and 400 mg of Ibuprofen every 4 hours.  Do not take more than 4,000mg of acetaminophen in a 24 hour period or 3200 mg of Ibuprofen in a 24 hr period.  Avoid alcohol and vitamin E as these may increase your tendency to bleed.  You may put an ice pack around the bandaged area for 20 minutes every 2-3 hours. This may help reduce swelling, bruising, and pain. Make sure the ice pack is waterproof so that the pressure bandage does not get wet.   You may see a small amount of drainage or blood on your pressure bandage. This is normal. However, if drainage or bleeding continues or saturates the bandage, you  will need to apply firm pressure over the bandage with a washcloth for 15 minutes. If bleeding continues after applying pressure for 15 minutes then go to the nearest emergency room.  48 Hours After Surgery  Carefully remove the bandage and start daily wound care and dressing changes. You may also now shower and get the wound wet.  Daily Wound Care:  Wash wound with a mild soap and water.  Use caution when washing the wound, be gentle and do not let the forceful shower stream hit the wound directly. DO NOT SUBMERGE HAND IN WATER FOR TWO WEEKS OR UNTIL HEALED  Pat dry.  Apply Vaseline (from a new container or tube) over the suture line with a Q-tip. It is very important to keep the wound continuously moist, as wounds heal best in a moist environment.  Keep the site covered until sutures have dissolved.  You can cover it with a Telfa (non-stick) dressing and tape or a band-aid.    If you are unable to keep wound covered, you must apply Vaseline every 2-3 hours (while awake) to ensure it is being kept moist for optimal healing. A dressing overnight is recommended to keep the area moist.  Call Us If:  You have pain that is not controlled with Tylenol/Ibuprofen  You have signs or symptoms of an infection, such as: fever over 100 degrees F, redness, warmth, or foul-smelling or yellow drainage from the wound.  Who should I call with questions?  [facility]: [phone]   [facility]: [phone]  For urgent needs outside of business hours call the [facility] at [phone] and ask to speak with the dermatology resident on call

## 2022-08-15 NOTE — PROGRESS NOTES
[facility] Dermatologic Surgery [facility] [place] Procedure Note    Dermatology Problem List:    1. 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 Herpes Zoster - s/p valtrex  ____________________________________________      Date of Service:  Aug 15, 2022  Surgery: Mohs micrographic surgery    Case 1  Repair Type: intermediate  Repair Size: 3.5 cm  Suture Material: Fast Absorbing Gut 5-0  Tumor Type: SCC - Squamous cell carcinoma  Location: left dorsal hand  Derm-Path Accession #: [id number]  PreOp Size: 1.4X1.0 cm  PostOp Size: 2.1X1.5 cm  Mohs Accession #: [id number]  Level of Defect: fascia      Procedure:  We discussed the principles of treatment and most likely complications including scarring, bleeding, infection, swelling, pain, crusting, nerve damage, large wound,  incomplete excision, wound dehiscence,  nerve damage, recurrence, and a second procedure may be recommended to obtain the best cosmetic or functional result.    Informed consent was obtained and the patient underwent the procedure as follows:  The patient was placed supine on the operating table.  The cancer was identified, outlined with a marker, and verified by the patient.  The entire surgical field was prepped with Hibiclens.  The surgical site was anesthetized using Lidocaine 1% with epi 1:100,000.      The area of clinically apparent tumor was debulked. The layer of tissue was then surgically excised using a #15 blade and was then transferred onto a specimen sheet maintaining the orientation of the specimen. Hemostasis was obtained using bipolar electrocoagulation. The wound site was then covered with a dressing while the tissue samples were processed for examination.    The excised tissue was transported to the Mohs histology laboratory maintaining the tissue orientation.  The tissue specimen was relaxed so that the entire surgical margin was in a a single horizontal plane for  sectioning and inked for precise mapping.  A precise reference map was drawn to reflect the sectioning of the specimen, colored inking of the margins, and orientation on the patient. The tissue was processed using horizontal sectioning of the base and continuous peripheral margins.  The histopathologic sections were reviewed in conjunction with the reference map.    Total blocks: 1    Total slides:  2    There were no cancer cells visualized on examination, therefore Mohs surgery was complete.    REPAIR: An intermediate layered linear closure was selected as the procedure which would maximally preserve both function and cosmesis.    After the excision of the tumor, the area was undermined. Hemostasis was obtained with electrocoagulation.  Closure was oriented so that the wound was in the patient's natural skin tension lines. The subcutaneous and dermal layers were then closed with 4-0 monocryl buried vertical mattress sutures. The epidermis was then carefully approximated along the length of the wound using 5-0 Fast Absorbing Gut running subcuticular sutures.     Estimated blood loss was less than 10 ml for all surgical sites. A sterile pressure dressing was applied and wound care instructions, with a written handout, were given. The patient was discharged from the [facility]ologic Surgery Center alert and ambulatory.    Follow-up in PRN    Scribe Disclosure:   Antonio GARCIA, am serving as a scribe to document services personally performed by this physician, Dr. Meek Agrawal, based on data collection and the provider's statements to me.     Provider Disclosure:   The documentation recorded by the scribe accurately reflects the services I personally performed and the decisions made by me.    I personally performed the procedures today.    Meek Agrawal DO    Department of Dermatology  [facility]: Phone: [phone], Fax:[phone]  [place]  [facility]: Phone: [phone], Fax: [phone]    Care and Laboratory Testing Performed at:  [facility]   Dermatology Clinic  [address] [place] [address]

## 2022-08-15 NOTE — LETTER
8/15/2022         RE: Breezy Tijerina  [address]  [place] [address]        Dear Colleague,    Thank you for referring your patient, Breezy Tijerina, to the [facility]. Please see a copy of my visit note below.    [facility]at[facility] Surgery [facility] [place] Procedure Note    Dermatology Problem List:    1. 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 Herpes Zoster - s/p valtrex  ____________________________________________      Date of Service:  Aug 15, 2022  Surgery: Mohs micrographic surgery    Case 1  Repair Type: intermediate  Repair Size: 3.5 cm  Suture Material: Fast Absorbing Gut 5-0  Tumor Type: SCC - Squamous cell carcinoma  Location: left dorsal hand  Derm-Path Accession #: [id number]  PreOp Size: 1.4X1.0 cm  PostOp Size: 2.1X1.5 cm  Mohs Accession #: [id number]  Level of Defect: fascia      Procedure:  We discussed the principles of treatment and most likely complications including scarring, bleeding, infection, swelling, pain, crusting, nerve damage, large wound,  incomplete excision, wound dehiscence,  nerve damage, recurrence, and a second procedure may be recommended to obtain the best cosmetic or functional result.    Informed consent was obtained and the patient underwent the procedure as follows:  The patient was placed supine on the operating table.  The cancer was identified, outlined with a marker, and verified by the patient.  The entire surgical field was prepped with Hibiclens.  The surgical site was anesthetized using Lidocaine 1% with epi 1:100,000.      The area of clinically apparent tumor was debulked. The layer of tissue was then surgically excised using a #15 blade and was then transferred onto a specimen sheet maintaining the orientation of the specimen. Hemostasis was obtained using bipolar electrocoagulation. The wound site was then covered with a dressing while the  tissue samples were processed for examination.    The excised tissue was transported to the Mohs histology laboratory maintaining the tissue orientation.  The tissue specimen was relaxed so that the entire surgical margin was in a a single horizontal plane for sectioning and inked for precise mapping.  A precise reference map was drawn to reflect the sectioning of the specimen, colored inking of the margins, and orientation on the patient. The tissue was processed using horizontal sectioning of the base and continuous peripheral margins.  The histopathologic sections were reviewed in conjunction with the reference map.    Total blocks: 1    Total slides:  2    There were no cancer cells visualized on examination, therefore Mohs surgery was complete.    REPAIR: An intermediate layered linear closure was selected as the procedure which would maximally preserve both function and cosmesis.    After the excision of the tumor, the area was undermined. Hemostasis was obtained with electrocoagulation.  Closure was oriented so that the wound was in the patient's natural skin tension lines. The subcutaneous and dermal layers were then closed with 4-0 monocryl buried vertical mattress sutures. The epidermis was then carefully approximated along the length of the wound using 5-0 Fast Absorbing Gut running subcuticular sutures.     Estimated blood loss was less than 10 ml for all surgical sites. A sterile pressure dressing was applied and wound care instructions, with a written handout, were given. The patient was discharged from the Dermatologic Surgery Center alert and ambulatory.    Follow-up in PRN    Scribe Disclosure:   Antonio GARCIA, am serving as a scribe to document services personally performed by this physician, Dr. Meek Agrawal, based on data collection and the provider's statements to me.     Provider Disclosure:   The documentation recorded by the scribe accurately reflects the services I personally performed and  the decisions made by me.    I personally performed the procedures today.    Meek Agrawal DO    Department of Dermatology  [facility]: Phone: [phone], Fax:[phone]  [facility]: Phone: [phone], Fax: [phone]    Care and Laboratory Testing Performed at:  [facility]   Dermatology Clinic  [address] [place] [address]        Again, thank you for allowing me to participate in the care of your patient.        Sincerely,        Meek Agrawal MD

## 2022-08-16 ENCOUNTER — TELEPHONE (OUTPATIENT)
Dept: DERMATOLOGY | Facility: CLINIC | Age: 77
End: 2022-08-16

## 2022-08-16 NOTE — TELEPHONE ENCOUNTER
Que is 1 day s/p Mohs for SCC on left dorsal hand.  I called to follow up on how they are doing post-procedure.  I left a detailed message requesting a call back if there were any questions or concerns.  Otherwise, if no concerns no need to call me back.    Olena Donaldson RN

## 2022-10-03 ENCOUNTER — HEALTH MAINTENANCE LETTER (OUTPATIENT)
Age: 77
End: 2022-10-03

## 2022-10-13 ENCOUNTER — LAB (OUTPATIENT)
Dept: LAB | Facility: CLINIC | Age: 77
End: 2022-10-13
Payer: MEDICARE

## 2022-10-13 DIAGNOSIS — Z11.59 NEED FOR HEPATITIS C SCREENING TEST: ICD-10-CM

## 2022-10-13 DIAGNOSIS — Z13.220 SCREENING FOR HYPERLIPIDEMIA: ICD-10-CM

## 2022-10-13 LAB
CHOLEST SERPL-MCNC: 225 MG/DL
FASTING STATUS PATIENT QL REPORTED: NO
HDLC SERPL-MCNC: 120 MG/DL
LDLC SERPL CALC-MCNC: 92 MG/DL
NONHDLC SERPL-MCNC: 105 MG/DL
TRIGL SERPL-MCNC: 66 MG/DL

## 2022-10-13 PROCEDURE — 36415 COLL VENOUS BLD VENIPUNCTURE: CPT

## 2022-10-13 PROCEDURE — 80061 LIPID PANEL: CPT

## 2022-10-13 PROCEDURE — 86803 HEPATITIS C AB TEST: CPT

## 2022-10-14 ENCOUNTER — OFFICE VISIT (OUTPATIENT)
Dept: FAMILY MEDICINE | Facility: CLINIC | Age: 77
End: 2022-10-14
Payer: MEDICARE

## 2022-10-14 ENCOUNTER — TELEPHONE (OUTPATIENT)
Dept: FAMILY MEDICINE | Facility: CLINIC | Age: 77
End: 2022-10-14

## 2022-10-14 VITALS
HEIGHT: 73 IN | DIASTOLIC BLOOD PRESSURE: 91 MMHG | HEART RATE: 81 BPM | RESPIRATION RATE: 22 BRPM | TEMPERATURE: 97.8 F | BODY MASS INDEX: 32.6 KG/M2 | SYSTOLIC BLOOD PRESSURE: 153 MMHG | OXYGEN SATURATION: 95 % | WEIGHT: 246 LBS

## 2022-10-14 DIAGNOSIS — K70.0 FATTY LIVER, ALCOHOLIC: ICD-10-CM

## 2022-10-14 DIAGNOSIS — Z23 NEED FOR INFLUENZA VACCINATION: ICD-10-CM

## 2022-10-14 DIAGNOSIS — E78.00 HYPERCHOLESTEROLEMIA: ICD-10-CM

## 2022-10-14 DIAGNOSIS — E66.1 CLASS 1 DRUG-INDUCED OBESITY WITHOUT SERIOUS COMORBIDITY WITH BODY MASS INDEX (BMI) OF 33.0 TO 33.9 IN ADULT: ICD-10-CM

## 2022-10-14 DIAGNOSIS — E66.811 CLASS 1 DRUG-INDUCED OBESITY WITHOUT SERIOUS COMORBIDITY WITH BODY MASS INDEX (BMI) OF 33.0 TO 33.9 IN ADULT: ICD-10-CM

## 2022-10-14 DIAGNOSIS — R25.1 TREMOR: ICD-10-CM

## 2022-10-14 DIAGNOSIS — Z23 HIGH PRIORITY FOR 2019-NCOV VACCINE: ICD-10-CM

## 2022-10-14 DIAGNOSIS — F10.20 UNCOMPLICATED ALCOHOL DEPENDENCE (H): ICD-10-CM

## 2022-10-14 DIAGNOSIS — H25.013 CORTICAL AGE-RELATED CATARACT OF BOTH EYES: ICD-10-CM

## 2022-10-14 DIAGNOSIS — I10 BENIGN ESSENTIAL HYPERTENSION: ICD-10-CM

## 2022-10-14 DIAGNOSIS — Z00.00 MEDICARE ANNUAL WELLNESS VISIT, SUBSEQUENT: Primary | ICD-10-CM

## 2022-10-14 PROBLEM — H25.9 AGE-RELATED CATARACT: Status: ACTIVE | Noted: 2022-10-14

## 2022-10-14 LAB — HCV AB SERPL QL IA: NONREACTIVE

## 2022-10-14 PROCEDURE — G0008 ADMIN INFLUENZA VIRUS VAC: HCPCS | Performed by: INTERNAL MEDICINE

## 2022-10-14 PROCEDURE — 0124A COVID-19,PF,PFIZER BOOSTER BIVALENT: CPT | Performed by: INTERNAL MEDICINE

## 2022-10-14 PROCEDURE — G0439 PPPS, SUBSEQ VISIT: HCPCS | Performed by: INTERNAL MEDICINE

## 2022-10-14 PROCEDURE — 99214 OFFICE O/P EST MOD 30 MIN: CPT | Mod: 25 | Performed by: INTERNAL MEDICINE

## 2022-10-14 PROCEDURE — 90662 IIV NO PRSV INCREASED AG IM: CPT | Performed by: INTERNAL MEDICINE

## 2022-10-14 PROCEDURE — 91312 COVID-19,PF,PFIZER BOOSTER BIVALENT: CPT | Performed by: INTERNAL MEDICINE

## 2022-10-14 RX ORDER — SIMVASTATIN 80 MG
40 TABLET ORAL
COMMUNITY
Start: 2022-08-23 | End: 2022-12-02

## 2022-10-14 ASSESSMENT — ENCOUNTER SYMPTOMS: ARTHRALGIAS: 1

## 2022-10-14 ASSESSMENT — ACTIVITIES OF DAILY LIVING (ADL): CURRENT_FUNCTION: NO ASSISTANCE NEEDED

## 2022-10-14 ASSESSMENT — PAIN SCALES - GENERAL: PAINLEVEL: MILD PAIN (2)

## 2022-10-14 NOTE — TELEPHONE ENCOUNTER
Pt would like to provider to complete and sign. Pt states he would like a call when this is ready to be picked up    Form placed in Dr. Jaime's bin on desk    Riana Olivia MA

## 2022-10-14 NOTE — PROGRESS NOTES
"SUBJECTIVE:   Que is a 77 year old who presents for Preventive Visit.    77-year-old gentleman with history of hypertension and dyslipidemia presents for a physical examination.  He is also come in to establish care with me.  He also has gotten care at the [facility] and gets all his medication from there.  He offers no concerns.        Patient has been advised of split billing requirements and indicates understanding: Yes  Are you in the first 12 months of your Medicare coverage?  No    Healthy Habits:     In general, how would you rate your overall health?  Good    Frequency of exercise:  4-5 days/week    Duration of exercise:  45-60 minutes    Do you usually eat at least 4 servings of fruit and vegetables a day, include whole grains    & fiber and avoid regularly eating high fat or \"junk\" foods?  Yes    Medication side effects:  None    Ability to successfully perform activities of daily living:  No assistance needed    Home Safety:  No safety concerns identified    Hearing Impairment:  Need to ask people to speak up or repeat themselves    In the past 6 months, have you been bothered by leaking of urine? Yes    In general, how would you rate your overall mental or emotional health?  Excellent      PHQ-2 Total Score: 0    Additional concerns today:  Yes    Do you feel safe in your environment? Yes    Have you ever done Advance Care Planning? (For example, a Health Directive, POLST, or a discussion with a medical provider or your loved ones about your wishes): Yes, patient states has an Advance Care Planning document and will bring a copy to the clinic.       Fall risk  Fallen 2 or more times in the past year?: Yes  Any fall with injury in the past year?: No    Cognitive Screening   1) Repeat 3 items (Leader, Season, Table)    2) Clock draw: normal numbers, but did not draw the hands correctly  3) 3 item recall: Recalls 3 objects  Results: 3 items recalled: COGNITIVE IMPAIRMENT LESS LIKELY    Mini-CogTM " Copyright MIAH Pagan. Licensed by the author for use in [facility]; reprinted with permission (peter@[facility]). All rights reserved.      Do you have sleep apnea, excessive snoring or daytime drowsiness?: snoring    Reviewed and updated as needed this visit by clinical staff                  Reviewed and updated as needed this visit by Provider                 Social History     Tobacco Use     Smoking status: Former     Packs/day: 1.00     Years: 6.00     Pack years: 6.00     Types: Cigarettes     Quit date: 1972     Years since quittin.8     Smokeless tobacco: Never   Substance Use Topics     Alcohol use: Yes     Comment: has about 1 drink per day     If you drink alcohol do you typically have >3 drinks per day or >7 drinks per week? Yes      Alcohol Use 10/14/2022   Prescreen: >3 drinks/day or >7 drinks/week? Yes   AUDIT SCORE  11     AUDIT - Alcohol Use Disorders Identification Test - Reproduced from the World Health Organization Audit 2001 (Second Edition) 10/14/2022   1.  How often do you have a drink containing alcohol? 4 or more times a week   2.  How many drinks containing alcohol do you have on a typical day when you are drinking? 3 or 4   3.  How often do you have five or more drinks on one occasion? Never   4.  How often during the last year have you found that you were not able to stop drinking once you had started? Never   5.  How often during the last year have you failed to do what was normally expected of you because of drinking? Never   6.  How often during the last year have you needed a first drink in the morning to get yourself going after a heavy drinking session? Never   7.  How often during the last year have you had a feeling of guilt or remorse after drinking? Monthly   8.  How often during the last year have you been unable to remember what happened the night before because of your drinking? Never   9.  Have you or someone else been injured because of your drinking? No   10.  Has a relative, friend, doctor or other health care worker been concerned about your drinking or suggested you cut down? Yes, during the last year   TOTAL SCORE 11       Current providers sharing in care for this patient include:   Patient Care Team:  No Ref-Primary, Physician as PCP - Isma Mazariegos MD as Assigned PCP  Natividad Hassan PA-C as Physician Assistant (Dermatology)  Meek Agrawal MD as Assigned Surgical Provider    The following health maintenance items are reviewed in Epic and correct as of today:  Health Maintenance   Topic Date Due     ADVANCE CARE PLANNING  Never done     HEPATITIS B IMMUNIZATION (1 of 3 - 3-dose series) Never done     HEPATITIS C SCREENING  Never done     AORTIC ANEURYSM SCREENING (SYSTEM ASSIGNED)  Never done     MEDICARE ANNUAL WELLNESS VISIT  03/09/2021     COVID-19 Vaccine (5 - Booster for Pfizer series) 08/03/2022     INFLUENZA VACCINE (1) 09/01/2022     ANNUAL REVIEW OF HM ORDERS  06/13/2023     FALL RISK ASSESSMENT  10/14/2023     DTAP/TDAP/TD IMMUNIZATION (6 - Td or Tdap) 12/18/2023     LIPID  10/13/2027     COLORECTAL CANCER SCREENING  07/11/2032     PHQ-2 (once per calendar year)  Completed     Pneumococcal Vaccine: 65+ Years  Completed     ZOSTER IMMUNIZATION  Completed     IPV IMMUNIZATION  Aged Out     MENINGITIS IMMUNIZATION  Aged Out     BP Readings from Last 3 Encounters:   10/14/22 (!) 153/91   08/15/22 (!) 143/90   07/19/22 110/68    Wt Readings from Last 3 Encounters:   10/14/22 111.6 kg (246 lb)   07/19/22 103.9 kg (229 lb)   06/13/22 101.1 kg (222 lb 14.4 oz)                  Patient Active Problem List   Diagnosis     Balance problems     Benign essential hypertension     Tubular adenoma of colon     Hypercholesterolemia     Alcohol dependence (H)     Tremor     Past Surgical History:   Procedure Laterality Date     COLONOSCOPY       COLONOSCOPY N/A 7/11/2022    Procedure: COLONOSCOPY, FLEXIBLE, WITH LESION REMOVAL USING SNARE;  Surgeon:  Ernesto Cervantes MD;  Location: [facility] OR     COLONOSCOPY WITH CO2 INSUFFLATION N/A 2022    Procedure: COLONOSCOPY, WITH CO2 INSUFFLATION;  Surgeon: Ernesto Cervantes MD;  Location: [facility] OR     HIP ARTHROSCOPY W/ LABRAL REPAIR      right shoulder     JOINT REPLACEMENT      both hips and both knees       Social History     Tobacco Use     Smoking status: Former     Packs/day: 1.00     Years: 6.00     Pack years: 6.00     Types: Cigarettes     Quit date:      Years since quittin.8     Smokeless tobacco: Never   Substance Use Topics     Alcohol use: Yes     Alcohol/week: 21.0 standard drinks     Types: 21 Standard drinks or equivalent per week     Family History   Problem Relation Age of Onset     Coronary Artery Disease Brother          Current Outpatient Medications   Medication Sig Dispense Refill     lisinopril (ZESTRIL) 40 MG tablet Take 40 mg by mouth       simvastatin (ZOCOR) 80 MG tablet 40 mg       folic acid (FOLVITE) 1 MG tablet Take 1 tablet by mouth daily (Patient not taking: Reported on 10/14/2022)       propranolol SR BEADS (INDREAL XL) 80 MG 24 hr capsule Take 1 capsule by mouth daily (Patient not taking: Reported on 10/14/2022)       thiamine (B-1) 100 MG tablet Take 1 tablet by mouth daily (Patient not taking: Reported on 10/14/2022)       valACYclovir (VALTREX) 1000 mg tablet Take 1 tablet (1,000 mg) by mouth 3 times daily for 7 days 21 tablet 0     No Known Allergies  Recent Labs   Lab Test 10/13/22  1300   LDL 92      TRIG 66        Review of Systems   Eyes: Positive for visual disturbance.   Genitourinary: Positive for impotence.   Musculoskeletal: Positive for arthralgias.     Constitutional, HEENT, cardiovascular, pulmonary, GI, , musculoskeletal, neuro, skin, endocrine and psych systems are negative, except as otherwise noted.    OBJECTIVE:   There were no vitals taken for this visit. Estimated body mass index is 30.21 kg/m  as calculated from the following:     "Height as of 6/13/22: 1.854 m (6' 1\").    Weight as of 7/19/22: 103.9 kg (229 lb).  Physical Exam  GENERAL: healthy, alert and no distress  EYES: Eyes grossly normal to inspection, PERRL and conjunctivae and sclerae normal  HENT: ear canals and TM's normal, nose and mouth without ulcers or lesions  NECK: no adenopathy, no asymmetry, masses, or scars and thyroid normal to palpation  RESP: lungs clear to auscultation - no rales, rhonchi or wheezes  CV: regular rate and rhythm, normal S1 S2, no S3 or S4, no murmur, click or rub, no peripheral edema and peripheral pulses strong  ABDOMEN: soft, nontender, no hepatosplenomegaly, no masses and bowel sounds normal   (male): normal male genitalia without lesions or urethral discharge, no hernia  RECTAL (male): normal sphincter tone, no rectal masses, prostate normal size, smooth, nontender without nodules or masses  MS: no gross musculoskeletal defects noted, no edema  SKIN: no suspicious lesions or rashes  NEURO: Normal strength and tone, mentation intact and speech normal  BACK: no CVA tenderness, no paralumbar tenderness  PSYCH: mentation appears normal, affect normal/bright  LYMPH: no cervical, supraclavicular, axillary, or inguinal adenopathy    Diagnostic Test Results:  Labs reviewed in Epic    ASSESSMENT / PLAN:     1.  Medicare annual wellness exam completed and is good.  2.  Benign essential hypertension currently being treated with lisinopril 40 mg a day.  Blood pressure today borderline high.  I will check comprehensive metabolic profile and get back to results and recommendation.  We will need to see him sooner for follow-up on hypertension.  3.  Hypercholesterolemia been treated with simvastatin 80 mg a day.  Cholesterol was 225, , LDL 92 and triglycerides 66 on 10/13/2022 which is acceptable.  Continue with current dose of simvastatin.  4.  Uncomplicated alcohol dependency.  Discussed his alcohol use and the importance of quitting alcohol or curtailing " "significantly.  He is already had some issues related to fatty liver disease.  5.  Fatty liver alcohol-related most likely.  We will be checking CMP today to look at liver function studies.  6.  Cataract age-related bilateral.  We will obtain a referral from his optometrist.  7.  Tremor most likely essential tremor.  He was better when he was taking propanolol so we will prescribe propanolol XL 80 mg daily.  That should also help his blood pressure.  We will follow-up.  8.  Obesity class I with BMI of 33.  9.  Flu vaccine and by Valent COVID-19 vaccine provided today.      Patient has been advised of split billing requirements and indicates understanding: Yes    COUNSELING:  Reviewed preventive health counseling, as reflected in patient instructions       Regular exercise       Healthy diet/nutrition       Vision screening       Hearing screening    Estimated body mass index is 30.21 kg/m  as calculated from the following:    Height as of 6/13/22: 1.854 m (6' 1\").    Weight as of 7/19/22: 103.9 kg (229 lb).    Weight management plan: Discussed healthy diet and exercise guidelines    He reports that he quit smoking about 50 years ago. He has a 6.00 pack-year smoking history. He has never used smokeless tobacco.      Appropriate preventive services were discussed with this patient, including applicable screening as appropriate for cardiovascular disease, diabetes, osteopenia/osteoporosis, and glaucoma.  As appropriate for age/gender, discussed screening for colorectal cancer, prostate cancer, breast cancer, and cervical cancer. Checklist reviewing preventive services available has been given to the patient.    Reviewed patients plan of care and provided an AVS. The Basic Care Plan (routine screening as documented in Health Maintenance) for Breezy meets the Care Plan requirement. This Care Plan has been established and reviewed with the Patient.    Counseling Resources:  ATP IV Guidelines  Pooled Cohorts Equation " Calculator  Breast Cancer Risk Calculator  Breast Cancer: Medication to Reduce Risk  FRAX Risk Assessment  ICSI Preventive Guidelines  Dietary Guidelines for Americans, 2010  USDA's MyPlate  ASA Prophylaxis  Lung CA Screening    Aman Jaime MD  [employer] [facility]    Identified Health Risks:

## 2022-10-15 PROBLEM — E66.1 CLASS 1 DRUG-INDUCED OBESITY WITHOUT SERIOUS COMORBIDITY WITH BODY MASS INDEX (BMI) OF 33.0 TO 33.9 IN ADULT: Status: ACTIVE | Noted: 2022-10-15

## 2022-10-17 DIAGNOSIS — H25.013 CORTICAL AGE-RELATED CATARACT OF BOTH EYES: Primary | ICD-10-CM

## 2022-10-18 NOTE — TELEPHONE ENCOUNTER
Talk to the patient on the phone and informed him that he does not qualify for disability parking certificate.  He does not have qualifying disabilities.  Patient understood.

## 2022-10-24 ENCOUNTER — LAB (OUTPATIENT)
Dept: LAB | Facility: CLINIC | Age: 77
End: 2022-10-24
Payer: MEDICARE

## 2022-10-24 DIAGNOSIS — I10 BENIGN ESSENTIAL HYPERTENSION: ICD-10-CM

## 2022-10-24 DIAGNOSIS — K70.0 FATTY LIVER, ALCOHOLIC: ICD-10-CM

## 2022-10-24 LAB
ALBUMIN SERPL-MCNC: 3.9 G/DL (ref 3.4–5)
ALP SERPL-CCNC: 68 U/L (ref 40–150)
ALT SERPL W P-5'-P-CCNC: 20 U/L (ref 0–70)
ANION GAP SERPL CALCULATED.3IONS-SCNC: 6 MMOL/L (ref 3–14)
AST SERPL W P-5'-P-CCNC: 19 U/L (ref 0–45)
BILIRUB SERPL-MCNC: 0.9 MG/DL (ref 0.2–1.3)
BUN SERPL-MCNC: 16 MG/DL (ref 7–30)
CALCIUM SERPL-MCNC: 9.3 MG/DL (ref 8.5–10.1)
CHLORIDE BLD-SCNC: 109 MMOL/L (ref 94–109)
CO2 SERPL-SCNC: 25 MMOL/L (ref 20–32)
CREAT SERPL-MCNC: 0.84 MG/DL (ref 0.66–1.25)
GFR SERPL CREATININE-BSD FRML MDRD: >90 ML/MIN/1.73M2
GLUCOSE BLD-MCNC: 113 MG/DL (ref 70–99)
POTASSIUM BLD-SCNC: 4.1 MMOL/L (ref 3.4–5.3)
PROT SERPL-MCNC: 7.5 G/DL (ref 6.8–8.8)
SODIUM SERPL-SCNC: 140 MMOL/L (ref 133–144)

## 2022-10-24 PROCEDURE — 36415 COLL VENOUS BLD VENIPUNCTURE: CPT

## 2022-10-24 PROCEDURE — 80053 COMPREHEN METABOLIC PANEL: CPT

## 2022-11-07 ENCOUNTER — TELEPHONE (OUTPATIENT)
Dept: FAMILY MEDICINE | Facility: CLINIC | Age: 77
End: 2022-11-07

## 2022-11-07 NOTE — TELEPHONE ENCOUNTER
Medication Question or Refill    Contacts       Type Contact Phone/Fax    11/07/2022 09:56 AM CST Phone (Incoming) Que Tijerina (Self) [phone] (H)          What medication are you calling about (include dose and sig)?: propranolol    Controlled Substance Agreement on file:   CSA -- Patient Level:    CSA: None found at the patient level.       [facility]      Do you need a refill? Yes: as soon as possible    When did you use the medication last? Its been 3-4wks, had annual with pcp and was informed that pcp would renew prescription after labs    Patient offered an appointment? No    Do you have any questions or concerns?  Yes: waiting for call back from pcp    Preferred Pharmacy:   [facility] - [place], [place] - [address]  [address] [facility] [address]  Phone: [phone] Fax: [phone]      Okay to leave a detailed message?: Yes at Home number on file [phone](home)

## 2022-11-08 PROBLEM — Z86.0101 H/O ADENOMATOUS POLYP OF COLON: Status: ACTIVE | Noted: 2022-07-11

## 2022-11-09 NOTE — TELEPHONE ENCOUNTER
Patient called in requesting update on refill request for Propranolol, also asking about recent lab results.    Informed patient that Dr. Jaime sent over a new 6 month [facility] [place], phone number to pharmacy given to patient to follow-up with them re: prescription. Patient verbalized understanding, stating he will call if the pharmacy does not have the medication or he has other questions about this.    Patient also asking about recent labs on 10/24/22, CMP lab results. Relayed provider result note to patient, patient verbalized understanding. Patient asking specifically for liver labs. Informed patient that his AST/ALT, alk phos, albumin and total bilirubin are within the reference range and gave exact values, but unable to  results for patient at this time. Patient verbalized understanding. No further questions or concerns at this time.      Fani Neri, SHIVAN, RN  [facility]

## 2022-12-02 ENCOUNTER — TELEPHONE (OUTPATIENT)
Dept: FAMILY MEDICINE | Facility: CLINIC | Age: 77
End: 2022-12-02

## 2022-12-02 DIAGNOSIS — I10 BENIGN ESSENTIAL HYPERTENSION: ICD-10-CM

## 2022-12-02 DIAGNOSIS — R25.1 TREMOR: ICD-10-CM

## 2022-12-02 DIAGNOSIS — E78.00 HYPERCHOLESTEROLEMIA: Primary | ICD-10-CM

## 2022-12-02 RX ORDER — LISINOPRIL 40 MG/1
40 TABLET ORAL DAILY
Qty: 90 TABLET | Refills: 3 | Status: SHIPPED | OUTPATIENT
Start: 2022-12-02 | End: 2022-12-24

## 2022-12-02 RX ORDER — SIMVASTATIN 80 MG
40 TABLET ORAL AT BEDTIME
Qty: 90 TABLET | Refills: 3 | Status: SHIPPED | OUTPATIENT
Start: 2022-12-02 | End: 2022-12-24

## 2022-12-02 NOTE — TELEPHONE ENCOUNTER
..Reason for Call:  Medication or medication refill:    Do you use a [facility]?  Name of the pharmacy and phone number for the current request:      Name of the medication requested:Propranolol, Zocor,Lisinopril    Other request: *    Can we leave a detailed message on this number?   No! Please contact patient regarding where he would like to have medications refilled.  Phone number patient can be reached at:   [phone]    Best Time: Mornings    Call taken on 12/2/2022 at 2:24 PM by Claire Pace

## 2022-12-09 ENCOUNTER — OFFICE VISIT (OUTPATIENT)
Dept: OPHTHALMOLOGY | Facility: CLINIC | Age: 77
End: 2022-12-09
Attending: INTERNAL MEDICINE
Payer: MEDICARE

## 2022-12-09 DIAGNOSIS — H25.813 COMBINED FORMS OF AGE-RELATED CATARACT OF BOTH EYES: Primary | ICD-10-CM

## 2022-12-09 DIAGNOSIS — H52.203 MYOPIA OF BOTH EYES WITH ASTIGMATISM AND PRESBYOPIA: ICD-10-CM

## 2022-12-09 DIAGNOSIS — H52.13 MYOPIA OF BOTH EYES WITH ASTIGMATISM AND PRESBYOPIA: ICD-10-CM

## 2022-12-09 DIAGNOSIS — H25.013 CORTICAL AGE-RELATED CATARACT OF BOTH EYES: ICD-10-CM

## 2022-12-09 DIAGNOSIS — B02.22 TRIGEMINAL HERPES ZOSTER: ICD-10-CM

## 2022-12-09 DIAGNOSIS — H52.4 MYOPIA OF BOTH EYES WITH ASTIGMATISM AND PRESBYOPIA: ICD-10-CM

## 2022-12-09 PROCEDURE — 76519 ECHO EXAM OF EYE: CPT | Mod: 26 | Performed by: OPHTHALMOLOGY

## 2022-12-09 PROCEDURE — 76519 ECHO EXAM OF EYE: CPT | Mod: RT | Performed by: OPHTHALMOLOGY

## 2022-12-09 PROCEDURE — 99204 OFFICE O/P NEW MOD 45 MIN: CPT | Performed by: OPHTHALMOLOGY

## 2022-12-09 ASSESSMENT — REFRACTION_WEARINGRX
OD_CYLINDER: +0.25
SPECS_TYPE: PAL
OD_ADD: +2.50
OS_ADD: +2.50
OD_SPHERE: -3.75
OS_CYLINDER: +0.75
OS_SPHERE: -3.75
OS_AXIS: 041
OD_AXIS: 145

## 2022-12-09 ASSESSMENT — VISUAL ACUITY
METHOD: SNELLEN - LINEAR
OD_CC: 20/50
OS_CC+: -2
OS_CC: 20/50
CORRECTION_TYPE: GLASSES

## 2022-12-09 ASSESSMENT — CUP TO DISC RATIO
OS_RATIO: 0.2
OD_RATIO: 0.2

## 2022-12-09 ASSESSMENT — TONOMETRY
OS_IOP_MMHG: 11
IOP_METHOD: TONOPEN
OD_IOP_MMHG: 10

## 2022-12-09 ASSESSMENT — EXTERNAL EXAM - RIGHT EYE: OD_EXAM: NORMAL

## 2022-12-09 ASSESSMENT — SLIT LAMP EXAM - LIDS
COMMENTS: NORMAL
COMMENTS: NORMAL

## 2022-12-09 ASSESSMENT — EXTERNAL EXAM - LEFT EYE: OS_EXAM: NORMAL

## 2022-12-09 NOTE — NURSING NOTE
Chief Complaints and History of Present Illnesses   Patient presents with     Cataract       Chief Complaint(s) and History of Present Illness(es)     Cataract            Laterality: both eyes          Comments    Patient referred by Dr. Cr for cataract evaluation. Pt states his right eye vision is worsening. Would like to discuss surgery options. No drop use.                  Luisana Ellis, COA

## 2022-12-09 NOTE — PROGRESS NOTES
HPI     Cataract    In both eyes.           Comments    Patient referred by Dr. Cr for cataract evaluation. Pt states his right eye vision is worsening. Would like to discuss surgery options. No drop use.           Last edited by Luisana Ellis COA on 12/9/2022  8:07 AM.         Review of systems for the eyes was negative other than the pertinent positives/negatives listed in the HPI.      Assessment & Plan    HPI:  Breezy Tijerina is a 77 year old male HTN, HLD, myopia with astigmatism and presbyopia who presents for cataract evaluation. Notes decreased vision right eye > left eye     POHx: myopia with astigmatism and presbyopia  PMHx: HTN, HLD  Current Medications: lisinopril (ZESTRIL) 40 MG tablet, Take 1 tablet (40 mg) by mouth daily  propranolol SR BEADS (INDREAL XL) 80 MG 24 hr capsule, Take 1 capsule (80 mg) by mouth daily  simvastatin (ZOCOR) 80 MG tablet, Take 0.5 tablets (40 mg) by mouth At Bedtime    No current facility-administered medications on file prior to visit.    FHx: denies family history of ocular conditions   PSHx: denies history of ocular surgeries       Current Eye Medications:  None    Assessment & Plan:  (H25.813) Combined forms of age-related cataract of both eyes  (primary encounter diagnosis)  (H25.013) Cortical age-related cataract of both eyes  Special equipment/needs:  Eye: right  Anesthesia:topical  Dilates to: 6.5  Iris expansion:  Unlikely  Pseudoexfoliation: No  Trypan Blue: No  Trauma: No    Able lay to flat: Yes  Blood Thinner: No   Tamsulosin: No  DM: No  Guttae: No    Dominant Eye: right    Plan: -2.50 toric Eyehance  Measured to -2.50/40cm for near    We discussed the benefits, alternatives, and risks to cataract surgery, including blindness, decreased vision, and need for additional surgeries; and informed consent was obtained.  Proceed with CE/IOL right eye.    Patient prefers right eye only     (B02.22) Trigeminal herpes zoster   No prior ocular  involvement    (H52.13,  H52.203,  H52.4) Myopia of both eyes with astigmatism and presbyopia  Hold pending Cataract extraction/iol    Return for POD0.        Huy Prasad MD     Attending Physician Attestation:  Complete documentation of historical and exam elements from today's encounter can be found in the full encounter summary report (not reduplicated in this progress note).  I personally obtained the chief complaint(s) and history of present illness.  I confirmed and edited as necessary the review of systems, past medical/surgical history, family history, social history, and examination findings as documented by others; and I examined the patient myself.  I personally reviewed the relevant tests, images, and reports as documented above.  I formulated and edited as necessary the assessment and plan and discussed the findings and management plan with the patient and family. - Huy Prasad MD     ;

## 2022-12-16 ENCOUNTER — TELEPHONE (OUTPATIENT)
Dept: FAMILY MEDICINE | Facility: CLINIC | Age: 77
End: 2022-12-16

## 2022-12-16 NOTE — TELEPHONE ENCOUNTER
Reason for Call:  Medication or medication refill:    Do you use a [facility]?  Name of the pharmacy and phone number for the current request: [facility] pharmacy [address]. Phone number- [phone]    Name of the medication requested: lisinopril (ZESTRIL) 40 MG tablet, propranolol SR BEADS (INDREAL XL) 80 MG 24 hr capsule, simvastatin (ZOCOR) 80 MG tablet    Other request:     Can we leave a detailed message on this number? YES    Phone number patient can be reached at: Cell number on file:    Telephone Information:   Mobile [phone]       Best Time:    Call taken on 12/16/2022 at 9:31 AM by Grace Verma

## 2022-12-16 NOTE — TELEPHONE ENCOUNTER
"RN called patient to clarify if he has checked with his pharmacy to see if his medications are ready for  as they were sent in on 2022. Patient states when he calls pharmacy they state prescription is .         RN called patient's pharmacy and they reported that when they [facility] an [facility] outside the [facility] they have to send the order to their provider within the [facility] to review and then that provider rewrites the order and is then available to patient. Per the pharmacist patient has not had a visit with a provider at the [facility] \"in quite a while\". Patient needs follow up visit with [facility] provider to continue getting medications fills through them.    RN called patient and relayed message above to him. Patient verbalized understanding and states he will call to get an appointment.     RN did offer to have medications sent to a different pharmacy to make sure he does not run out of the medication, patient declined stating \"No, with the [facility] I have a max $6 copay for prescriptions and I won't be able to afford it else ware\".     No further questions or concerns at this time.     Allegra Stone RN    [facility]    "

## 2022-12-19 DIAGNOSIS — E78.00 HYPERCHOLESTEROLEMIA: ICD-10-CM

## 2022-12-19 DIAGNOSIS — R25.1 TREMOR: ICD-10-CM

## 2022-12-19 DIAGNOSIS — I10 BENIGN ESSENTIAL HYPERTENSION: ICD-10-CM

## 2022-12-19 RX ORDER — LISINOPRIL 40 MG/1
40 TABLET ORAL DAILY
Qty: 90 TABLET | Refills: 3 | OUTPATIENT
Start: 2022-12-19

## 2022-12-19 RX ORDER — SIMVASTATIN 80 MG
40 TABLET ORAL AT BEDTIME
Qty: 90 TABLET | Refills: 3 | OUTPATIENT
Start: 2022-12-19

## 2022-12-19 NOTE — TELEPHONE ENCOUNTER
Medication Question or Refill    Contacts       Type Contact Phone/Fax    12/19/2022 09:57 AM CST Phone (Incoming) Que Tijerina (Self) [phone] (H)          What medication are you calling about (include dose and sig)?: lisinopril, simvastatin and propranolol    Controlled Substance Agreement on file:   CSA -- Patient Level:    CSA: None found at the patient level.       Who prescribed the medication?: Dr Jaime    Do you need a refill? Yes:     When did you use the medication last?     Patient offered an appointment? No    Do you have any questions or concerns?  No    Preferred Pharmacy:    [facility] [address] [place] [address] [address] [facility] [address]  [address]  [facility] [address]  Phone: [phone] Fax: [phone]      Could we send this information to you in [facility] or would you prefer to receive a phone call?:   Patient would prefer a phone call   Okay to leave a detailed message?: Yes at Home number on file [phone] (home)    Sonali Alcantara

## 2022-12-23 DIAGNOSIS — I10 BENIGN ESSENTIAL HYPERTENSION: ICD-10-CM

## 2022-12-23 DIAGNOSIS — E78.00 HYPERCHOLESTEROLEMIA: ICD-10-CM

## 2022-12-23 DIAGNOSIS — R25.1 TREMOR: ICD-10-CM

## 2022-12-23 NOTE — TELEPHONE ENCOUNTER
Reason for Call:  Medication or medication refill:    Do you use a [facility]?  Name of the pharmacy and phone number for the current request:     Name of the medication requested: lisinopril (ZESTRIL) 40 MG tablet, propranolol SR BEADS (INDREAL XL) 80 MG 24 hr capsule,    propranolol SR BEADS (INDREAL XL) 80 MG 24 hr capsule  80 mg, DAILY 3 ordered              simvastatin (ZOCOR) 80 MG tablet               Other request:     Can we leave a detailed message on this number? YES    Phone number patient can be reached at: Cell number on file:    Telephone Information:   Mobile [phone]       Best Time:     Call taken on 12/23/2022 at 9:12 AM by Grace Verma

## 2022-12-23 NOTE — TELEPHONE ENCOUNTER
Called patient about refill request below. Informed patient medications were sent to [facility] pharmacy on 12/2/2022 already. [facility] states [facility] due to him not having a primary provider there and cannot get an appointment soon.     Patient is requesting to have these medications resent to [facility] in [place] on [facility].     Routing to provider to review.    LOPEZ Olivares  [facility]

## 2022-12-24 RX ORDER — SIMVASTATIN 80 MG
40 TABLET ORAL AT BEDTIME
Qty: 90 TABLET | Refills: 3 | Status: SHIPPED | OUTPATIENT
Start: 2022-12-24

## 2022-12-24 RX ORDER — LISINOPRIL 40 MG/1
40 TABLET ORAL DAILY
Qty: 90 TABLET | Refills: 3 | Status: SHIPPED | OUTPATIENT
Start: 2022-12-24

## 2022-12-30 DIAGNOSIS — H25.813 COMBINED FORMS OF AGE-RELATED CATARACT OF BOTH EYES: Primary | ICD-10-CM

## 2022-12-30 RX ORDER — KETOROLAC TROMETHAMINE 5 MG/ML
SOLUTION OPHTHALMIC
Qty: 4 ML | Refills: 1 | Status: SHIPPED | OUTPATIENT
Start: 2022-12-30 | End: 2023-01-01

## 2022-12-30 RX ORDER — MOXIFLOXACIN 5 MG/ML
1 SOLUTION/ DROPS OPHTHALMIC 4 TIMES DAILY
Qty: 1.5 ML | Refills: 1 | Status: SHIPPED | OUTPATIENT
Start: 2022-12-30 | End: 2023-01-06

## 2022-12-30 RX ORDER — PREDNISOLONE ACETATE 10 MG/ML
SUSPENSION/ DROPS OPHTHALMIC
Qty: 4 ML | Refills: 1 | Status: SHIPPED | OUTPATIENT
Start: 2022-12-30 | End: 2023-01-01

## 2023-01-01 ENCOUNTER — OFFICE VISIT (OUTPATIENT)
Dept: OPHTHALMOLOGY | Facility: CLINIC | Age: 78
End: 2023-01-01
Payer: MEDICARE

## 2023-01-01 ENCOUNTER — ANESTHESIA EVENT (OUTPATIENT)
Dept: SURGERY | Facility: AMBULATORY SURGERY CENTER | Age: 78
End: 2023-01-01
Payer: MEDICARE

## 2023-01-01 ENCOUNTER — TELEPHONE (OUTPATIENT)
Dept: OPHTHALMOLOGY | Facility: CLINIC | Age: 78
End: 2023-01-01
Payer: MEDICARE

## 2023-01-01 ENCOUNTER — ANESTHESIA (OUTPATIENT)
Dept: SURGERY | Facility: AMBULATORY SURGERY CENTER | Age: 78
End: 2023-01-01
Payer: MEDICARE

## 2023-01-01 ENCOUNTER — APPOINTMENT (OUTPATIENT)
Dept: ADMISSION | Facility: AMBULATORY SURGERY CENTER | Age: 78
End: 2023-01-01

## 2023-01-01 ENCOUNTER — TELEPHONE (OUTPATIENT)
Dept: OPHTHALMOLOGY | Facility: CLINIC | Age: 78
End: 2023-01-01

## 2023-01-01 ENCOUNTER — HOSPITAL ENCOUNTER (OUTPATIENT)
Facility: AMBULATORY SURGERY CENTER | Age: 78
Discharge: HOME | End: 2023-02-23
Attending: OPHTHALMOLOGY
Payer: MEDICARE

## 2023-01-01 ENCOUNTER — OFFICE VISIT (OUTPATIENT)
Dept: DERMATOLOGY | Facility: CLINIC | Age: 78
End: 2023-01-01
Payer: MEDICARE

## 2023-01-01 ENCOUNTER — HOSPITAL ENCOUNTER (OUTPATIENT)
Facility: AMBULATORY SURGERY CENTER | Age: 78
Discharge: HOME | End: 2023-01-31
Attending: OPHTHALMOLOGY
Payer: MEDICARE

## 2023-01-01 ENCOUNTER — TRANSFERRED RECORDS (OUTPATIENT)
Dept: HEALTH INFORMATION MANAGEMENT | Facility: CLINIC | Age: 78
End: 2023-01-01
Payer: MEDICARE

## 2023-01-01 ENCOUNTER — HEALTH MAINTENANCE LETTER (OUTPATIENT)
Age: 78
End: 2023-01-01

## 2023-01-01 VITALS
SYSTOLIC BLOOD PRESSURE: 155 MMHG | TEMPERATURE: 97.6 F | HEART RATE: 66 BPM | OXYGEN SATURATION: 96 % | DIASTOLIC BLOOD PRESSURE: 89 MMHG | RESPIRATION RATE: 20 BRPM

## 2023-01-01 VITALS
RESPIRATION RATE: 16 BRPM | TEMPERATURE: 98.5 F | WEIGHT: 240 LBS | DIASTOLIC BLOOD PRESSURE: 88 MMHG | SYSTOLIC BLOOD PRESSURE: 164 MMHG | OXYGEN SATURATION: 96 % | BODY MASS INDEX: 31.3 KG/M2

## 2023-01-01 DIAGNOSIS — L81.4 LENTIGINES: ICD-10-CM

## 2023-01-01 DIAGNOSIS — Z96.1 PSEUDOPHAKIA OF BOTH EYES: Primary | ICD-10-CM

## 2023-01-01 DIAGNOSIS — L82.1 SEBORRHEIC KERATOSES: Primary | ICD-10-CM

## 2023-01-01 DIAGNOSIS — Z96.1 PSEUDOPHAKIA, RIGHT EYE: Primary | ICD-10-CM

## 2023-01-01 DIAGNOSIS — D18.01 CHERRY ANGIOMA: ICD-10-CM

## 2023-01-01 DIAGNOSIS — H26.061: Primary | ICD-10-CM

## 2023-01-01 DIAGNOSIS — H25.812 COMBINED FORM OF AGE-RELATED CATARACT, LEFT EYE: ICD-10-CM

## 2023-01-01 DIAGNOSIS — H52.4 PRESBYOPIA: ICD-10-CM

## 2023-01-01 DIAGNOSIS — H52.13 MYOPIA OF BOTH EYES: ICD-10-CM

## 2023-01-01 DIAGNOSIS — D22.9 MULTIPLE BENIGN NEVI: ICD-10-CM

## 2023-01-01 DIAGNOSIS — Z85.828 HISTORY OF NONMELANOMA SKIN CANCER: ICD-10-CM

## 2023-01-01 DIAGNOSIS — Z96.1 PSEUDOPHAKIA, LEFT EYE: Primary | ICD-10-CM

## 2023-01-01 DIAGNOSIS — D49.2 SKIN NEOPLASM: ICD-10-CM

## 2023-01-01 LAB
ALT SERPL-CCNC: 67 U/L
AST SERPL-CCNC: 68 U/L
CHOLESTEROL (EXTERNAL): 220 MG/DL
CREATININE (EXTERNAL): 1 MG/DL (ref 0.7–1.2)
GLUCOSE (EXTERNAL): 92 MG/DL (ref 70–100)
HDLC SERPL-MCNC: 89 MG/DL
LDL CHOLESTEROL (EXTERNAL): 107 MG/DL
NON HDL CHOLESTEROL (EXTERNAL): 131 MG/DL
PATH REPORT.COMMENTS IMP SPEC: NORMAL
PATH REPORT.COMMENTS IMP SPEC: NORMAL
PATH REPORT.FINAL DX SPEC: NORMAL
PATH REPORT.GROSS SPEC: NORMAL
PATH REPORT.MICROSCOPIC SPEC OTHER STN: NORMAL
PATH REPORT.RELEVANT HX SPEC: NORMAL
POTASSIUM (EXTERNAL): 4 MMOL/L (ref 3.5–5.1)
TRIGLYCERIDES (EXTERNAL): 119 MG/DL

## 2023-01-01 PROCEDURE — G8907 PT DOC NO EVENTS ON DISCHARG: HCPCS

## 2023-01-01 PROCEDURE — G8918 PT W/O PREOP ORDER IV AB PRO: HCPCS

## 2023-01-01 PROCEDURE — 99024 POSTOP FOLLOW-UP VISIT: CPT | Performed by: OPHTHALMOLOGY

## 2023-01-01 PROCEDURE — 66984 XCAPSL CTRC RMVL W/O ECP: CPT | Mod: 79 | Performed by: OPHTHALMOLOGY

## 2023-01-01 PROCEDURE — 99213 OFFICE O/P EST LOW 20 MIN: CPT | Mod: 25 | Performed by: PHYSICIAN ASSISTANT

## 2023-01-01 PROCEDURE — 66984 XCAPSL CTRC RMVL W/O ECP: CPT | Mod: RT

## 2023-01-01 PROCEDURE — 88305 TISSUE EXAM BY PATHOLOGIST: CPT | Performed by: DERMATOLOGY

## 2023-01-01 PROCEDURE — 66984 XCAPSL CTRC RMVL W/O ECP: CPT | Mod: LT

## 2023-01-01 PROCEDURE — 66984 XCAPSL CTRC RMVL W/O ECP: CPT | Mod: RT | Performed by: OPHTHALMOLOGY

## 2023-01-01 PROCEDURE — V2599 CONTACT LENS/ES OTHER TYPE: HCPCS

## 2023-01-01 PROCEDURE — 11102 TANGNTL BX SKIN SINGLE LES: CPT | Performed by: PHYSICIAN ASSISTANT

## 2023-01-01 PROCEDURE — 96999 UNLISTED SPEC DERM SVC/PX: CPT | Mod: RT | Performed by: OPHTHALMOLOGY

## 2023-01-01 DEVICE — TECNIS 1-PC CLEAR MONO 6.0MM 18.5D
Type: IMPLANTABLE DEVICE | Site: EYE | Status: FUNCTIONAL
Brand: TECNIS IOL

## 2023-01-01 DEVICE — TEC EYHANCE TOR II SMPLCTY 18.5D CYL2.25
Type: IMPLANTABLE DEVICE | Site: EYE | Status: FUNCTIONAL
Brand: TECNIS IOL

## 2023-01-01 RX ORDER — SODIUM CHLORIDE, SODIUM LACTATE, POTASSIUM CHLORIDE, CALCIUM CHLORIDE 600; 310; 30; 20 MG/100ML; MG/100ML; MG/100ML; MG/100ML
INJECTION, SOLUTION INTRAVENOUS CONTINUOUS
Status: DISCONTINUED | OUTPATIENT
Start: 2023-01-01 | End: 2023-01-01 | Stop reason: HOSPADM

## 2023-01-01 RX ORDER — LIDOCAINE 40 MG/G
CREAM TOPICAL
Status: DISCONTINUED | OUTPATIENT
Start: 2023-01-01 | End: 2023-01-01 | Stop reason: HOSPADM

## 2023-01-01 RX ORDER — MOXIFLOXACIN 5 MG/ML
1 SOLUTION/ DROPS OPHTHALMIC
Status: COMPLETED | OUTPATIENT
Start: 2023-01-01 | End: 2023-01-01

## 2023-01-01 RX ORDER — SODIUM CHLORIDE, SODIUM LACTATE, POTASSIUM CHLORIDE, CALCIUM CHLORIDE 600; 310; 30; 20 MG/100ML; MG/100ML; MG/100ML; MG/100ML
INJECTION, SOLUTION INTRAVENOUS CONTINUOUS PRN
Status: DISCONTINUED | OUTPATIENT
Start: 2023-01-01 | End: 2023-01-01

## 2023-01-01 RX ORDER — TETRACAINE HYDROCHLORIDE 5 MG/ML
SOLUTION OPHTHALMIC PRN
Status: DISCONTINUED | OUTPATIENT
Start: 2023-01-01 | End: 2023-01-01 | Stop reason: HOSPADM

## 2023-01-01 RX ORDER — BALANCED SALT SOLUTION 6.4; .75; .48; .3; 3.9; 1.7 MG/ML; MG/ML; MG/ML; MG/ML; MG/ML; MG/ML
SOLUTION OPHTHALMIC PRN
Status: DISCONTINUED | OUTPATIENT
Start: 2023-01-01 | End: 2023-01-01 | Stop reason: HOSPADM

## 2023-01-01 RX ORDER — FENTANYL CITRATE 50 UG/ML
INJECTION, SOLUTION INTRAMUSCULAR; INTRAVENOUS PRN
Status: DISCONTINUED | OUTPATIENT
Start: 2023-01-01 | End: 2023-01-01

## 2023-01-01 RX ORDER — MOXIFLOXACIN 5 MG/ML
1 SOLUTION/ DROPS OPHTHALMIC 4 TIMES DAILY
Qty: 3 ML | Refills: 0 | Status: SHIPPED | OUTPATIENT
Start: 2023-01-01

## 2023-01-01 RX ORDER — PREDNISOLONE ACETATE 10 MG/ML
1-2 SUSPENSION/ DROPS OPHTHALMIC 4 TIMES DAILY
Qty: 10 ML | Refills: 0 | Status: SHIPPED | OUTPATIENT
Start: 2023-01-01

## 2023-01-01 RX ORDER — CYCLOPENTOLAT/TROPIC/PHENYLEPH 1%-1%-2.5%
1 DROPS (EA) OPHTHALMIC (EYE)
Status: COMPLETED | OUTPATIENT
Start: 2023-01-01 | End: 2023-01-01

## 2023-01-01 RX ORDER — KETOROLAC TROMETHAMINE 5 MG/ML
1 SOLUTION OPHTHALMIC 4 TIMES DAILY
Qty: 10 ML | Refills: 0 | Status: SHIPPED | OUTPATIENT
Start: 2023-01-01

## 2023-01-01 RX ORDER — KETOROLAC TROMETHAMINE 5 MG/ML
1 SOLUTION OPHTHALMIC
Status: COMPLETED | OUTPATIENT
Start: 2023-01-01 | End: 2023-01-01

## 2023-01-01 RX ORDER — PROPARACAINE HYDROCHLORIDE 5 MG/ML
1 SOLUTION/ DROPS OPHTHALMIC ONCE
Status: DISCONTINUED | OUTPATIENT
Start: 2023-01-01 | End: 2023-01-01 | Stop reason: HOSPADM

## 2023-01-01 RX ORDER — MOXIFLOXACIN 5 MG/ML
1 SOLUTION/ DROPS OPHTHALMIC 4 TIMES DAILY
Qty: 3 ML | Refills: 0 | Status: SHIPPED | OUTPATIENT
Start: 2023-01-01 | End: 2023-01-01

## 2023-01-01 RX ORDER — PROPARACAINE HYDROCHLORIDE 5 MG/ML
1 SOLUTION/ DROPS OPHTHALMIC
Status: DISCONTINUED | OUTPATIENT
Start: 2023-01-01 | End: 2023-01-01 | Stop reason: HOSPADM

## 2023-01-01 RX ORDER — SODIUM CHLORIDE, SODIUM LACTATE, POTASSIUM CHLORIDE, CALCIUM CHLORIDE 600; 310; 30; 20 MG/100ML; MG/100ML; MG/100ML; MG/100ML
500 INJECTION, SOLUTION INTRAVENOUS CONTINUOUS
Status: DISCONTINUED | OUTPATIENT
Start: 2023-01-01 | End: 2023-01-01 | Stop reason: HOSPADM

## 2023-01-01 RX ORDER — MOXIFLOXACIN IN NACL,ISO-OS/PF 0.3MG/0.3
SYRINGE (ML) INTRAOCULAR PRN
Status: DISCONTINUED | OUTPATIENT
Start: 2023-01-01 | End: 2023-01-01 | Stop reason: HOSPADM

## 2023-01-01 RX ORDER — KETOROLAC TROMETHAMINE 5 MG/ML
1 SOLUTION OPHTHALMIC 4 TIMES DAILY
Qty: 10 ML | Refills: 0 | Status: SHIPPED | OUTPATIENT
Start: 2023-01-01 | End: 2023-01-01

## 2023-01-01 RX ORDER — PREDNISOLONE ACETATE 10 MG/ML
1-2 SUSPENSION/ DROPS OPHTHALMIC 4 TIMES DAILY
Qty: 10 ML | Refills: 0 | Status: SHIPPED | OUTPATIENT
Start: 2023-01-01 | End: 2023-01-01

## 2023-01-01 RX ADMIN — KETOROLAC TROMETHAMINE 1 DROP: 5 SOLUTION OPHTHALMIC at 08:45

## 2023-01-01 RX ADMIN — FENTANYL CITRATE 50 MCG: 50 INJECTION, SOLUTION INTRAMUSCULAR; INTRAVENOUS at 08:33

## 2023-01-01 RX ADMIN — Medication 1 DROP: at 08:37

## 2023-01-01 RX ADMIN — KETOROLAC TROMETHAMINE 1 DROP: 5 SOLUTION OPHTHALMIC at 08:40

## 2023-01-01 RX ADMIN — KETOROLAC TROMETHAMINE 1 DROP: 5 SOLUTION OPHTHALMIC at 07:30

## 2023-01-01 RX ADMIN — KETOROLAC TROMETHAMINE 1 DROP: 5 SOLUTION OPHTHALMIC at 08:36

## 2023-01-01 RX ADMIN — PROPARACAINE HYDROCHLORIDE 1 DROP: 5 SOLUTION/ DROPS OPHTHALMIC at 08:44

## 2023-01-01 RX ADMIN — MOXIFLOXACIN 1 DROP: 5 SOLUTION/ DROPS OPHTHALMIC at 08:41

## 2023-01-01 RX ADMIN — Medication 1 DROP: at 08:46

## 2023-01-01 RX ADMIN — PROPARACAINE HYDROCHLORIDE 1 DROP: 5 SOLUTION/ DROPS OPHTHALMIC at 07:36

## 2023-01-01 RX ADMIN — KETOROLAC TROMETHAMINE 1 DROP: 5 SOLUTION OPHTHALMIC at 07:42

## 2023-01-01 RX ADMIN — PROPARACAINE HYDROCHLORIDE 1 DROP: 5 SOLUTION/ DROPS OPHTHALMIC at 07:42

## 2023-01-01 RX ADMIN — MOXIFLOXACIN 1 DROP: 5 SOLUTION/ DROPS OPHTHALMIC at 07:42

## 2023-01-01 RX ADMIN — PROPARACAINE HYDROCHLORIDE 1 DROP: 5 SOLUTION/ DROPS OPHTHALMIC at 08:36

## 2023-01-01 RX ADMIN — SODIUM CHLORIDE, SODIUM LACTATE, POTASSIUM CHLORIDE, CALCIUM CHLORIDE: 600; 310; 30; 20 INJECTION, SOLUTION INTRAVENOUS at 08:33

## 2023-01-01 RX ADMIN — Medication 1 DROP: at 07:36

## 2023-01-01 RX ADMIN — FENTANYL CITRATE 25 MCG: 50 INJECTION, SOLUTION INTRAMUSCULAR; INTRAVENOUS at 09:58

## 2023-01-01 RX ADMIN — Medication 1 DROP: at 07:30

## 2023-01-01 RX ADMIN — MOXIFLOXACIN 1 DROP: 5 SOLUTION/ DROPS OPHTHALMIC at 07:36

## 2023-01-01 RX ADMIN — Medication 1 DROP: at 07:42

## 2023-01-01 RX ADMIN — FENTANYL CITRATE 25 MCG: 50 INJECTION, SOLUTION INTRAMUSCULAR; INTRAVENOUS at 10:04

## 2023-01-01 RX ADMIN — PROPARACAINE HYDROCHLORIDE 1 DROP: 5 SOLUTION/ DROPS OPHTHALMIC at 08:39

## 2023-01-01 RX ADMIN — MOXIFLOXACIN 1 DROP: 5 SOLUTION/ DROPS OPHTHALMIC at 07:30

## 2023-01-01 RX ADMIN — SODIUM CHLORIDE, SODIUM LACTATE, POTASSIUM CHLORIDE, CALCIUM CHLORIDE: 600; 310; 30; 20 INJECTION, SOLUTION INTRAVENOUS at 07:36

## 2023-01-01 RX ADMIN — Medication 1 DROP: at 08:41

## 2023-01-01 RX ADMIN — SODIUM CHLORIDE, SODIUM LACTATE, POTASSIUM CHLORIDE, CALCIUM CHLORIDE: 600; 310; 30; 20 INJECTION, SOLUTION INTRAVENOUS at 09:58

## 2023-01-01 RX ADMIN — KETOROLAC TROMETHAMINE 1 DROP: 5 SOLUTION OPHTHALMIC at 07:36

## 2023-01-01 RX ADMIN — MOXIFLOXACIN 1 DROP: 5 SOLUTION/ DROPS OPHTHALMIC at 08:46

## 2023-01-01 RX ADMIN — MOXIFLOXACIN 1 DROP: 5 SOLUTION/ DROPS OPHTHALMIC at 08:38

## 2023-01-01 RX ADMIN — PROPARACAINE HYDROCHLORIDE 1 DROP: 5 SOLUTION/ DROPS OPHTHALMIC at 07:30

## 2023-01-01 ASSESSMENT — REFRACTION_WEARINGRX
OD_ADD: +2.50
OD_AXIS: 145
OS_ADD: +2.50
OS_AXIS: 041
OS_SPHERE: -3.75
SPECS_TYPE: PAL
SPECS_TYPE: PAL
OD_CYLINDER: +0.25
OD_SPHERE: -3.75
OS_CYLINDER: +0.75
OD_AXIS: 145
OS_CYLINDER: +0.75
OS_AXIS: 041
OS_SPHERE: -3.75
OD_ADD: +2.50
OD_SPHERE: -3.75
OS_ADD: +2.50
OD_CYLINDER: +0.25

## 2023-01-01 ASSESSMENT — LIFESTYLE VARIABLES
TOBACCO_USE: 0
TOBACCO_USE: 0

## 2023-01-01 ASSESSMENT — REFRACTION_MANIFEST
OD_CYLINDER: SPHERE
OD_SPHERE: -2.50
OS_CYLINDER: SPHERE
OS_SPHERE: -2.25
OD_CYLINDER: SPHERE
OD_SPHERE: -2.25

## 2023-01-01 ASSESSMENT — EXTERNAL EXAM - RIGHT EYE
OD_EXAM: NORMAL
OD_EXAM: NORMAL

## 2023-01-01 ASSESSMENT — CUP TO DISC RATIO
OD_RATIO: 0.2
OS_RATIO: 0.2

## 2023-01-01 ASSESSMENT — TONOMETRY
IOP_METHOD: TONOPEN
OD_IOP_MMHG: 18
OS_IOP_MMHG: 09
IOP_METHOD: ICARE
OD_IOP_MMHG: 12
OD_IOP_MMHG: 09
OS_IOP_MMHG: 24
IOP_METHOD: APPLANATION
IOP_METHOD: TONOPEN

## 2023-01-01 ASSESSMENT — SLIT LAMP EXAM - LIDS
COMMENTS: NORMAL

## 2023-01-01 ASSESSMENT — VISUAL ACUITY
OS_PH_SC: J7
OD_CC: 20/20
OD_SC: 20/200
OS_CC: 20/20
OD_SC: 20/150
METHOD: SNELLEN - LINEAR
OD_SC: 20/150
METHOD: SNELLEN - LINEAR
OD_SC: J1+
OD_PH_SC: 20/40
METHOD: SNELLEN - LINEAR
OS_SC: 20/150
OD_CC: 20/40
CORRECTION_TYPE: GLASSES
OS_SC: 20/150
CORRECTION_TYPE: GLASSES
METHOD: SNELLEN - LINEAR

## 2023-01-01 ASSESSMENT — EXTERNAL EXAM - LEFT EYE
OS_EXAM: NORMAL
OS_EXAM: NORMAL

## 2023-01-01 ASSESSMENT — CONF VISUAL FIELD
OS_NORMAL: 1
OD_INFERIOR_TEMPORAL_RESTRICTION: 0
OS_INFERIOR_TEMPORAL_RESTRICTION: 0
OS_SUPERIOR_NASAL_RESTRICTION: 0
OD_INFERIOR_NASAL_RESTRICTION: 0
OD_NORMAL: 1
OD_SUPERIOR_TEMPORAL_RESTRICTION: 0
OS_INFERIOR_NASAL_RESTRICTION: 0
OS_SUPERIOR_TEMPORAL_RESTRICTION: 0
OD_SUPERIOR_NASAL_RESTRICTION: 0

## 2023-01-01 ASSESSMENT — ENCOUNTER SYMPTOMS
SEIZURES: 0
SEIZURES: 0

## 2023-01-05 ENCOUNTER — OFFICE VISIT (OUTPATIENT)
Dept: FAMILY MEDICINE | Facility: CLINIC | Age: 78
End: 2023-01-05
Payer: MEDICARE

## 2023-01-05 VITALS
WEIGHT: 254 LBS | OXYGEN SATURATION: 98 % | SYSTOLIC BLOOD PRESSURE: 138 MMHG | TEMPERATURE: 98.5 F | HEART RATE: 64 BPM | RESPIRATION RATE: 20 BRPM | BODY MASS INDEX: 33.66 KG/M2 | DIASTOLIC BLOOD PRESSURE: 84 MMHG | HEIGHT: 73 IN

## 2023-01-05 DIAGNOSIS — F10.20 UNCOMPLICATED ALCOHOL DEPENDENCE (H): ICD-10-CM

## 2023-01-05 DIAGNOSIS — Z01.818 PRE-OP EXAM: Primary | ICD-10-CM

## 2023-01-05 DIAGNOSIS — E66.811 CLASS 1 OBESITY DUE TO EXCESS CALORIES WITH SERIOUS COMORBIDITY AND BODY MASS INDEX (BMI) OF 33.0 TO 33.9 IN ADULT: ICD-10-CM

## 2023-01-05 DIAGNOSIS — H25.011 CORTICAL AGE-RELATED CATARACT, RIGHT EYE: ICD-10-CM

## 2023-01-05 DIAGNOSIS — E78.00 HYPERCHOLESTEROLEMIA: ICD-10-CM

## 2023-01-05 DIAGNOSIS — E66.09 CLASS 1 OBESITY DUE TO EXCESS CALORIES WITH SERIOUS COMORBIDITY AND BODY MASS INDEX (BMI) OF 33.0 TO 33.9 IN ADULT: ICD-10-CM

## 2023-01-05 DIAGNOSIS — R25.1 TREMOR: ICD-10-CM

## 2023-01-05 DIAGNOSIS — I10 BENIGN ESSENTIAL HYPERTENSION: ICD-10-CM

## 2023-01-05 PROBLEM — E66.1 CLASS 1 DRUG-INDUCED OBESITY WITHOUT SERIOUS COMORBIDITY WITH BODY MASS INDEX (BMI) OF 33.0 TO 33.9 IN ADULT: Status: RESOLVED | Noted: 2022-10-15 | Resolved: 2023-01-05

## 2023-01-05 PROBLEM — D12.6 TUBULAR ADENOMA OF COLON: Status: RESOLVED | Noted: 2022-06-13 | Resolved: 2023-01-05

## 2023-01-05 PROCEDURE — 99214 OFFICE O/P EST MOD 30 MIN: CPT | Performed by: INTERNAL MEDICINE

## 2023-01-05 ASSESSMENT — PAIN SCALES - GENERAL: PAINLEVEL: MILD PAIN (2)

## 2023-01-05 NOTE — PROGRESS NOTES
[address] [address]  Phone: [phone]  Primary Provider: Jessie Hernandez  Pre-op Performing Provider: JESSIE HERNANDEZ      PREOPERATIVE EVALUATION:  Today's date: 1/5/2023    Breezy Tijerina is a 77 year old male who presents for a preoperative evaluation.    Surgical Information:  Surgery/Procedure: PHACOEMULSIFICATION, CATARACT, WITH INTRAOCULAR LENS IMPLANT, TORIC LENS  Surgery Location: [facility]   Surgeon: Huy Prasad MD  Surgery Date: 1/31/2023  Time of Surgery: 9:50AM  Where patient plans to recover: At home with family  Fax number for surgical facility: Note does not need to be faxed, will be available electronically in Epic.    Type of Anesthesia Anticipated: Local with MAC    Assessment & Plan      1.  Preop physical exam completed.  Patient is medically optimized to undergo planned surgical procedure.  2.  Cortical age-related cataract right eye.  Patient to undergo cataract surgery.  3.  Benign essential hypertension well-controlled with combination of lisinopril and propanolol.  4.  Hypercholesterolemia well-controlled with simvastatin.  5.  Uncomplicated alcohol dependency.  Unchanged.  6.  Essential tremor little better with propranolol.  7.  Class I obesity with BMI 33.  Remaining unchanged.      The proposed surgical procedure is considered LOW risk.    RECOMMENDATION:  APPROVAL GIVEN to proceed with proposed procedure, without further diagnostic evaluation.    [id number]    Subjective     HPI related to upcoming procedure:     77-year-old gentleman with known history of hypertension, hypercholesterolemia is to undergo right eye cataract surgery.  He denies chest pain or shortness of breath.  No leg edema.  Offers no new concerns or complaints.      Preop Questions 1/5/2023   1. Have you ever had a heart attack or stroke? No   2. Have you ever had surgery on your heart or blood  vessels, such as a stent placement, a coronary artery bypass, or surgery on an artery in your head, neck, heart, or legs? No   3. Do you have chest pain with activity? No   4. Do you have a history of  heart failure? No   5. Do you currently have a cold, bronchitis or symptoms of other infection? No   6. Do you have a cough, shortness of breath, or wheezing? No   7. Do you or anyone in your family have previous history of blood clots? No   8. Do you or does anyone in your family have a serious bleeding problem such as prolonged bleeding following surgeries or cuts? No   9. Have you ever had problems with anemia or been told to take iron pills? No   10. Have you had any abnormal blood loss such as black, tarry or bloody stools? No   11. Have you ever had a blood transfusion? No   12. Are you willing to have a blood transfusion if it is medically needed before, during, or after your surgery? Yes   13. Have you or any of your relatives ever had problems with anesthesia? No   14. Do you have sleep apnea, excessive snoring or daytime drowsiness? No   15. Do you have any artifical heart valves or other implanted medical devices like a pacemaker, defibrillator, or continuous glucose monitor? No   16. Do you have artificial joints? YES -    17. Are you allergic to latex? No     Health Care Directive:  Patient does not have a Health Care Directive or Living Will:         Review of Systems  Constitutional, neuro, ENT, endocrine, pulmonary, cardiac, gastrointestinal, genitourinary, musculoskeletal, integument and psychiatric systems are negative, except as otherwise noted.    Patient Active Problem List    Diagnosis Date Noted     Class 1 drug-induced obesity without serious comorbidity with body mass index (BMI) of 33.0 to 33.9 in adult 10/15/2022     Priority: Medium     Hypercholesterolemia 10/14/2022     Priority: Medium     Alcohol dependence (H) 10/14/2022     Priority: Medium     Tremor 10/14/2022     Priority: Medium      Age-related cataract 10/14/2022     Priority: Medium     Fatty liver, alcoholic 10/14/2022     Priority: Medium     H/O adenomatous polyp of colon 07/11/2022     Priority: Medium     Balance problems 06/13/2022     Priority: Medium     Benign essential hypertension 06/13/2022     Priority: Medium     Tubular adenoma of colon 06/13/2022     Priority: Medium      Past Medical History:   Diagnosis Date     Age-related cataract 10/14/2022     Balance problems      Class 1 drug-induced obesity without serious comorbidity with body mass index (BMI) of 33.0 to 33.9 in adult 10/15/2022     Fatty liver, alcoholic 10/14/2022     H/O adenomatous polyp of colon 07/11/2022     HTN (hypertension)      Hypercholesterolemia 10/14/2022     Neuropathy of right peroneal nerve      Tremor 10/14/2022     Past Surgical History:   Procedure Laterality Date     COLONOSCOPY       COLONOSCOPY N/A 7/11/2022    Procedure: COLONOSCOPY, FLEXIBLE, WITH LESION REMOVAL USING SNARE;  Surgeon: Ernesto Cervantes MD;  Location: [facility]     COLONOSCOPY WITH CO2 INSUFFLATION N/A 7/11/2022    Procedure: COLONOSCOPY, WITH CO2 INSUFFLATION;  Surgeon: Ernesto Cervantes MD;  Location: [facility] OR     HIP ARTHROSCOPY W/ LABRAL REPAIR      right shoulder     JOINT REPLACEMENT      both hips and both knees     Current Outpatient Medications   Medication Sig Dispense Refill     ketorolac (ACULAR) 0.5 % ophthalmic solution Apply 1 drop to eye 4 times daily for 7 days, THEN 1 drop 3 times daily for 7 days, THEN 1 drop 2 times daily for 7 days, THEN 1 drop daily for 7 days. Start 2 days prior to surgery in operative eye. 4 mL 1     lisinopril (ZESTRIL) 40 MG tablet Take 1 tablet (40 mg) by mouth daily 90 tablet 3     moxifloxacin (VIGAMOX) 0.5 % ophthalmic solution Apply 1 drop to eye 4 times daily for 7 days Start morning of surgery in operative eye 1.5 mL 1     prednisoLONE acetate (PRED FORTE) 1 % ophthalmic suspension Apply 1 drop to eye 4 times daily  for 7 days, THEN 1 drop 3 times daily for 7 days, THEN 1 drop 2 times daily for 7 days, THEN 1 drop daily for 7 days. Start after surgery in operative eye 4 mL 1     propranolol SR BEADS (INDREAL XL) 80 MG 24 hr capsule Take 1 capsule (80 mg) by mouth daily 90 capsule 3     simvastatin (ZOCOR) 80 MG tablet Take 0.5 tablets (40 mg) by mouth At Bedtime 90 tablet 3       No Known Allergies     Social History     Tobacco Use     Smoking status: Former     Packs/day: 1.00     Years: 6.00     Pack years: 6.00     Types: Cigarettes     Quit date:      Years since quittin.0     Smokeless tobacco: Never   Substance Use Topics     Alcohol use: Yes     Alcohol/week: 21.0 standard drinks     Types: 21 Standard drinks or equivalent per week     Family History   Problem Relation Age of Onset     Coronary Artery Disease Brother      History   Drug Use Unknown         Objective     There were no vitals taken for this visit.    Physical Exam    GENERAL APPEARANCE: healthy, alert and no distress     EYES: EOMI,  PERRL     HENT: ear canals and TM's normal and nose and mouth without ulcers or lesions     NECK: no adenopathy, no asymmetry, masses, or scars and thyroid normal to palpation     RESP: lungs clear to auscultation - no rales, rhonchi or wheezes     CV: regular rates and rhythm, normal S1 S2, no S3 or S4 and no murmur, click or rub     ABDOMEN:  soft, nontender, no HSM or masses and bowel sounds normal     MS: extremities normal- no gross deformities noted, no evidence of inflammation in joints, FROM in all extremities.     SKIN: no suspicious lesions or rashes     NEURO: Normal strength and tone, sensory exam grossly normal, mentation intact and speech normal     PSYCH: mentation appears normal. and affect normal/bright     LYMPHATICS: No cervical adenopathy    Recent Labs   Lab Test 10/24/22  0946      POTASSIUM 4.1   CR 0.84        Diagnostics:     No EKG required for low risk surgery (cataract, skin  procedure, breast biopsy, etc).    Revised Cardiac Risk Index (RCRI):  The patient has the following serious cardiovascular risks for perioperative complications:   - No serious cardiac risks = 0 points     RCRI Interpretation: 0 points: Class I (very low risk - 0.4% complication rate)           Signed Electronically by: Aman Jaime MD  Copy of this evaluation report is provided to requesting physician.

## 2023-01-30 NOTE — ANESTHESIA PREPROCEDURE EVALUATION
Anesthesia Pre-Procedure Evaluation    Patient: Breezy Tijerina   MRN: [id number] : 1945        Procedure : Procedure(s):  PHACOEMULSIFICATION, CATARACT, WITH INTRAOCULAR LENS IMPLANT, TORIC LENS          Past Medical History:   Diagnosis Date     Age-related cataract 10/14/2022     Balance problems      Class 1 drug-induced obesity without serious comorbidity with body mass index (BMI) of 33.0 to 33.9 in adult 10/15/2022     Fatty liver, alcoholic 10/14/2022     H/O adenomatous polyp of colon 2022     HTN (hypertension)      Hypercholesterolemia 10/14/2022     Neuropathy of right peroneal nerve      Tremor 10/14/2022      Past Surgical History:   Procedure Laterality Date     COLONOSCOPY       COLONOSCOPY N/A 2022    Procedure: COLONOSCOPY, FLEXIBLE, WITH LESION REMOVAL USING SNARE;  Surgeon: Ernesto Cervantes MD;  Location: [facility]     COLONOSCOPY WITH CO2 INSUFFLATION N/A 2022    Procedure: COLONOSCOPY, WITH CO2 INSUFFLATION;  Surgeon: Ernesto Cervantes MD;  Location: [facility]     HIP ARTHROSCOPY W/ LABRAL REPAIR      right shoulder     JOINT REPLACEMENT      both hips and both knees      No Known Allergies   Social History     Tobacco Use     Smoking status: Former     Packs/day: 1.00     Years: 6.00     Pack years: 6.00     Types: Cigarettes     Quit date:      Years since quittin.1     Passive exposure: Never     Smokeless tobacco: Never   Substance Use Topics     Alcohol use: Yes     Alcohol/week: 21.0 standard drinks     Types: 21 Standard drinks or equivalent per week      Wt Readings from Last 1 Encounters:   23 115.2 kg (254 lb)        Anesthesia Evaluation   Pt has had prior anesthetic.     No history of anesthetic complications       ROS/MED HX  ENT/Pulmonary:    (-) tobacco use and asthma   Neurologic:    (-) no seizures and no CVA   Cardiovascular:     (+) Dyslipidemia hypertension----- (-) murmur   METS/Exercise Tolerance: >4 METS    Hematologic:        Musculoskeletal:       GI/Hepatic:     (+) liver disease,  (-) GERD   Renal/Genitourinary:       Endo:     (+) Obesity,     Psychiatric/Substance Use:     (+) alcohol abuse     Infectious Disease:       Malignancy:       Other:            Physical Exam    Airway        Mallampati: II   TM distance: > 3 FB   Neck ROM: full   Mouth opening: > 3 cm    Respiratory Devices and Support         Dental       (+) Minor Abnormalities - some fillings, tiny chips      Cardiovascular          Rhythm and rate: regular and normal (-) no murmur    Pulmonary   pulmonary exam normal        breath sounds clear to auscultation           OUTSIDE LABS:  CBC: No results found for: WBC, HGB, HCT, PLT  BMP:   Lab Results   Component Value Date     10/24/2022    POTASSIUM 4.1 10/24/2022    CHLORIDE 109 10/24/2022    CO2 25 10/24/2022    BUN 16 10/24/2022    CR 0.84 10/24/2022     (H) 10/24/2022     COAGS: No results found for: PTT, INR, FIBR  POC: No results found for: BGM, HCG, HCGS  HEPATIC:   Lab Results   Component Value Date    ALBUMIN 3.9 10/24/2022    PROTTOTAL 7.5 10/24/2022    ALT 20 10/24/2022    AST 19 10/24/2022    ALKPHOS 68 10/24/2022    BILITOTAL 0.9 10/24/2022     OTHER:   Lab Results   Component Value Date    TEODORA 9.3 10/24/2022       Anesthesia Plan    ASA Status:  2   NPO Status:  NPO Appropriate    Anesthesia Type: MAC.     - Reason for MAC: immobility needed   Induction: Intravenous.           Consents    Anesthesia Plan(s) and associated risks, benefits, and realistic alternatives discussed. Questions answered and patient/representative(s) expressed understanding.    - Discussed:     - Discussed with:  Patient      - Extended Intubation/Ventilatory Support Discussed: No.      - Patient is DNR/DNI Status: No    Use of blood products discussed: No .     Postoperative Care    Pain management: IV analgesics.   PONV prophylaxis: Ondansetron (or other 5HT-3)     Comments:    Other Comments: Discussed plan for IV  anesthetic with native airway, topical anesthetic. Discussed potential need for conversion to general anesthetic with airway management and potential for recall.         H&P reviewed: Unable to attach H&P to encounter due to EHR limitations. H&P Update: appropriate H&P reviewed, patient examined. No interval changes since H&P (within 30 days).         Ventura Reid MD

## 2023-01-31 NOTE — PROGRESS NOTES
Review of systems for the eyes was negative other than the pertinent positives/negatives listed in the HPI.      Assessment & Plan    HPI:  Breezy Tijerina is a 77 year old male HTN, HLD, myopia with astigmatism and presbyopia who presents for Postoperative day 0     POHx: myopia with astigmatism and presbyopia  PMHx: HTN, HLD  Current Medications: lisinopril (ZESTRIL) 40 MG tablet, Take 1 tablet (40 mg) by mouth daily  propranolol SR BEADS (INDREAL XL) 80 MG 24 hr capsule, Take 1 capsule (80 mg) by mouth daily  simvastatin (ZOCOR) 80 MG tablet, Take 0.5 tablets (40 mg) by mouth At Bedtime    [COMPLETED] ketorolac (ACULAR) 0.5 % ophthalmic solution 1 drop  lactated ringers infusion  [COMPLETED] moxifloxacin (VIGAMOX) 0.5 % ophthalmic solution 1 drop  proparacaine (ALCAINE) 0.5 % ophthalmic solution 1 drop  proparacaine (ALCAINE) 0.5 % ophthalmic solution 1 drop  [COMPLETED] tropicamide 1 %-cyclopentolate 1 %-phenylephrine 2.5% 1-1-2.5 % ophthalmic solution 1 drop      FHx: denies family history of ocular conditions   PSHx: Cataract extraction/iol [facility] right eye 01/31/23         Current Eye Medications:  None    Assessment & Plan:  Pseudophakia, right eye, day 0  Prasad right eye 01/31/23   Doing well  Keep patch in place at night for 7 days  Start post-operative drops and taper according to instructions  Post-operative do's and don'ts reviewed, questions answered    Recheck 1 week    (B02.22) Trigeminal herpes zoster   No prior ocular involvement    (H52.13,  H52.203,  H52.4) Myopia of both eyes with astigmatism and presbyopia  Hold pending Cataract extraction/iol    Return for as scheduled.        Huy Prasad MD     Attending Physician Attestation:  Complete documentation of historical and exam elements from today's encounter can be found in the full encounter summary report (not reduplicated in this progress note).  I personally obtained the chief complaint(s) and history of present illness.  I  confirmed and edited as necessary the review of systems, past medical/surgical history, family history, social history, and examination findings as documented by others; and I examined the patient myself.  I personally reviewed the relevant tests, images, and reports as documented above.  I formulated and edited as necessary the assessment and plan and discussed the findings and management plan with the patient and family. - Huy Prasad MD     ;

## 2023-01-31 NOTE — DISCHARGE INSTRUCTIONS
[facility]   Adult Discharge Orders & Instructions     For 24 hours after surgery    Get plenty of rest.  A responsible adult must stay with you for at least 24 hours after you leave the hospital.   Do not drive or use heavy equipment.  If you have weakness or tingling, don't drive or use heavy equipment until this feeling goes away.  Do not drink alcohol.  Avoid strenuous or risky activities.  Ask for help when climbing stairs.   You may feel lightheaded.  IF so, sit for a few minutes before standing.  Have someone help you get up.   If you have nausea (feel sick to your stomach): Drink only clear liquids such as apple juice, ginger ale, broth or 7-Up.  Rest may also help.  Be sure to drink enough fluids.  Move to a regular diet as you feel able.  You may have a slight fever. Call the doctor if your fever is over 100 F (37.7 C) (taken under the tongue) or lasts longer than 24 hours.  You may have a dry mouth, a sore throat, muscle aches or trouble sleeping.  These should go away after 24 hours.  Do not make important or legal decisions.     Call your doctor for any of the followin.  Signs of infection (fever, growing tenderness at the surgery site, a large amount of drainage or bleeding, severe pain, foul-smelling drainage, redness, swelling).    2. It has been over 8 to 10 hours since surgery and you are still not able to urinate (pass water).    3.  Headache for over 24 hours.                 4. Signs of Covid-19 infection (temperature over 100 degrees, shortness of breath, cough, loss of taste/smell, generalized body aches, persistent headache,                  chills, sore throat, nausea/vomiti

## 2023-01-31 NOTE — ANESTHESIA POSTPROCEDURE EVALUATION
Patient: Breezy Tijerina    Procedure: Procedure(s):  PHACOEMULSIFICATION, CATARACT, WITH INTRAOCULAR LENS IMPLANT, TORIC LENS       Anesthesia Type:  MAC    Note:  Disposition: Outpatient   Postop Pain Control: Uneventful            Sign Out: Well controlled pain   PONV: No   Neuro/Psych: Uneventful            Sign Out: Acceptable/Baseline neuro status   Airway/Respiratory: Uneventful            Sign Out: Acceptable/Baseline resp. status   CV/Hemodynamics: Uneventful            Sign Out: Acceptable CV status; No obvious hypovolemia; No obvious fluid overload   Other NRE:    DID A NON-ROUTINE EVENT OCCUR? No           Last vitals:  Vitals Value Taken Time   /89 01/31/23 1037   Temp 97.6  F (36.4  C) 01/31/23 1059   Pulse 66 01/31/23 1059   Resp 20 01/31/23 1059   SpO2 96 % 01/31/23 1059       Electronically Signed By: Ventura Reid MD  January 31, 2023  11:37 AM

## 2023-01-31 NOTE — OP NOTE
PREOPERATIVE DIAGNOSIS: Visually significant cataract, Right eye   POSTOPERATIVE DIAGNOSIS: Same   PROCEDURES:   1. Cataract extraction with intraocular lens implant Right eye.  SURGEON: Huy Prasad M.D.  INDICATIONS: The patient Breezy Tijerina presented to the eye clinic with decreased vision secondary to cataract in the Right eye. The risks, benefits and alternatives to cataract extraction were discussed. The patient elected to proceed. All questions were answered to the patient's satisfaction.   DESCRIPTION OF PROCEDURE:   Prior to the procedure, appropriate cardiac and respiratory monitors were applied to the patient.  In the pre-operative holding area, a drop of topical tetracaine followed by lidocaine gel followed by povidone iodine.  Pre-operative toric markings were placed at the 90-degree axis using a gentian violet marker while the patient was seated upright and at 169 using a robomarker.  The patient was brought to the operating room where a surgical pause was carried out to identify with all members of the surgical team the correct surgical site.  With adequate anesthesia, the Right eye was prepped and draped in the usual sterile fashion. A lid speculum was placed, and the operating microscope was rotated into position.  Toric axis was reviewed and marked according to pre-operative calculations and the 90-degree marking that had previously been placed.  A paracentesis was created.  Through this limbal paracentesis, the anterior chamber was filled with preservative-free lidocaine followed by viscoelastic.  A temporal wound was created at the limbus using a 2.6 mm blade. A capsulorrhexis was initiated using a bent 25-gauge needle and was completed in continuous and circular fashion using the capsulorrhexis forceps. The lens nucleus was hydrodissected using balanced salt solution.  The lens nucleus was rotated and removed using phacoemulsification in a divide and conquer technique.  Residual cortical  material was removed using irrigation-aspiration.  The capsular bag was reinflated to its maximal extent with cohesive viscoelastic. A Lawton ring was used to confirm the eye devi at 169 degrees. A 18.5 diopter [id number] inserted into the capsular bag.  The lens power selected was reviewed using the intraocular lens power measurements that were obtained preoperatively to confirm that the correct lens was selected for the desired post-operative refractive state. The residual viscoelastic was removed in its entirety, the wound were hydrated and found to be self-sealing.  Intracameral moxifloxacin was administered. Tactile pressure was confirmed to be in a normal range.  The lid speculum was removed and a patch and shield were applied.   The patient tolerated the procedure well, and there were no complications.    PLAN: The patient will be discharged to home and will follow up today  EBL:  None  Complications:  None  Implant Name Type Inv. Item Serial No.  Lot No. LRB No. Used Action   [id number] Toric II IOL +18.5   [id number]   Right 1 Implanted

## 2023-02-10 PROBLEM — H25.812 COMBINED FORM OF AGE-RELATED CATARACT, LEFT EYE: Status: ACTIVE | Noted: 2023-01-01

## 2023-02-10 NOTE — PROGRESS NOTES
HPI     Post Op (Ophthalmology) Right Eye    In right eye.           Comments    1 week post op RIGHT PHACOEMULSIFICATION, CATARACT, WITH INTRAOCULAR LENS IMPLANT, TORIC LENS (Right) 01/31/2023  He notes vision is great on the right eye.  Denies pain/discomfort in either eye.    He is using Pred/moxi/ketor 3x a day on the right eye.           Last edited by Jing Arevalo on 2/10/2023  8:12 AM.         Review of systems for the eyes was negative other than the pertinent positives/negatives listed in the HPI.      Assessment & Plan    HPI:  Breezy Tijerina is a 77 year old male HTN, HLD, myopia with astigmatism and presbyopia who presents for Postoperative week 1. Doing well. Would like to discusses Cataract extraction/iol left eye     POHx: myopia with astigmatism and presbyopia  PMHx: HTN, HLD  Current Medications: lisinopril (ZESTRIL) 40 MG tablet, Take 1 tablet (40 mg) by mouth daily  propranolol SR BEADS (INDREAL XL) 80 MG 24 hr capsule, Take 1 capsule (80 mg) by mouth daily  simvastatin (ZOCOR) 80 MG tablet, Take 0.5 tablets (40 mg) by mouth At Bedtime    No current facility-administered medications on file prior to visit.    FHx: denies family history of ocular conditions   PSHx: Cataract extraction/iol Prasad right eye 01/31/23         Current Eye Medications:  Moxifloxacin/pred forte/ketorolac three times a day right eye     Assessment & Plan:  Pseudophakia, right eye  Prasad right eye 01/31/23   Continue drop taper    (H25.813) Combined forms of age-related cataract of left eyes    Special equipment/needs:  Eye: right  Anesthesia:topical  Dilates to: 6.5  Iris expansion:  Unlikely  Pseudoexfoliation: No  Trypan Blue: No  Trauma: No     Able lay to flat: Yes  Blood Thinner: No   Tamsulosin: No  DM: No  Guttae: No    Dominant Eye: right    Plan: -2.50 toric Eyehance  Measured to -2.50/40cm for near    Patient now desires left eye  Proceed with Cataract extraction/iol left eye    Will try to add to a  surgical day with 7 patients     (B02.22) Trigeminal herpes zoster   No prior ocular involvement    (H52.13,  H52.203,  H52.4) Myopia of both eyes with astigmatism and presbyopia  Hold pending Cataract extraction/iol    Return for POD0.        Huy Prasad MD     Attending Physician Attestation:  Complete documentation of historical and exam elements from today's encounter can be found in the full encounter summary report (not reduplicated in this progress note).  I personally obtained the chief complaint(s) and history of present illness.  I confirmed and edited as necessary the review of systems, past medical/surgical history, family history, social history, and examination findings as documented by others; and I examined the patient myself.  I personally reviewed the relevant tests, images, and reports as documented above.  I formulated and edited as necessary the assessment and plan and discussed the findings and management plan with the patient and family. - Huy Prasad MD     ;

## 2023-02-10 NOTE — NURSING NOTE
Chief Complaints and History of Present Illnesses   Patient presents with     Post Op (Ophthalmology) Right Eye       Chief Complaint(s) and History of Present Illness(es)     Post Op (Ophthalmology) Right Eye            Laterality: right eye          Comments    1 week post op RIGHT PHACOEMULSIFICATION, CATARACT, WITH INTRAOCULAR LENS IMPLANT, TORIC LENS (Right) 01/31/2023  He notes vision is great on the right eye.  Denies pain/discomfort in either eye.    He is using Pred/moxi/ketor 3x a day on the right eye.                  LAURA Llanos  8:12 AM 02/10/2023

## 2023-02-13 NOTE — TELEPHONE ENCOUNTER
Scheduled surgery for 2/23 in [facility] with Dr. Prasad.    H&P with PCP.    Post-op scheduled for 3/10.    Patient declined new surgery packet as he has his old one still.    No further questions/concerns at this time.

## 2023-02-15 NOTE — NURSING NOTE
Breezy Tijerina's goals for this visit include:   Chief Complaint   Patient presents with     Skin Check     Patient here for a skin check, areas concern none hx NMSC       He requests these members of his care team be copied on today's visit information:     PCP: Aman Jaime    Referring Provider:  No referring provider defined for this encounter.    There were no vitals taken for this visit.    Do you need any medication refills at today's visit? No         Annette Mao EMT

## 2023-02-15 NOTE — LETTER
2/15/2023         RE: Breezy Tijerina  [address]  [place] [address]        Dear Colleague,    Thank you for referring your patient, Breezy Tijerina, to the [facility]. Please see a copy of my visit note below.    [facility] Note  Encounter Date: Feb 15, 2023  Office Visit     Dermatology Problem List:   0. NUB - L posterior thigh - s/p bx 2/15/23  1. 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 Herpes Zoster - s/p valtrex  ____________________________________________    Assessment & Plan:    # Neoplasm of uncertain behavior (D49.2) on the L posterior thigh. The differential diagnosis includes DN vs MM vs other  - shave bx - see below    # History of nonmelanoma skin cancer, no clinical evidence of recurrence..   - continue skin exams every 6mo     # Multiple clinically benign nevi on the trunk and extremities. No treatment is necessary at this time unless the lesion changes or becomes symptomatic.    - ABCDEs of melanoma were discussed and self skin checks were advised.  - Sunscreen: Apply 20 minutes prior to going outdoors and reapply every two hours, when wet or sweating. We recommend using an SPF 30 or higher, and to use one that is water resistant.       # Seborrheic keratosis, non irritated on the trunk and extremities. Explained to patient benign nature of lesion. No treatment is necessary at this time unless the lesion changes or becomes symptomatic.     - Monitor for changes.    # Cherry Angiomas - trunk and extremities. Explained to patient benign nature of lesion. No treatment is necessary at this time unless the lesion changes or becomes symptomatic.      # Solar lentigines on the sun exposed skin areas. Benign nature was discussed. No further intervention required at this time.    - Sun precaution was advised including the use of sun screens of SPF 30 or higher, sun  protective clothing, and avoidance of tanning beds.      Procedures Performed:   Shave bx -After discussion of benefits and risks including but not limited to bleeding, infection, scar, incomplete removal, recurrence, and non-diagnostic biopsy, written consent and photographs were obtained. The area was cleaned with isopropyl alcohol. 0.1mL of 1% lidocaine with epinephrine was injected to obtain adequate anesthesia of the lesion on the L posterior thigh. A shave biopsy was performed. Hemostasis was achieved with aluminium chloride. Vaseline and a sterile dressing were applied. The patient tolerated the procedure and no complications were noted. The patient was provided with verbal and written post care instructions.       Follow-up: 6 month(s) in-person, or earlier for new or changing lesions    Staff and Scribe:     Scribe Disclosure:   I, Issa Granados, am serving as a scribe to document services personally performed by this physician, Natividad Hassan PA-C, based on data collection and the provider's statements to me.   Provider Disclosure:   The documentation recorded by the scribe accurately reflects the services I personally performed and the decisions made by me.    All risks, benefits and alternatives were discussed with patient.  Patient is in agreement and understands the assessment and plan.  All questions were answered.    Natividad Hassan PA-C, MPAS  [facility] [place]: Phone: [phone], Fax: [phone]  [facility]: Phone: [phone],  Fax: [phone]  [facility]: Phone: [phone], Fax: [phone]  ____________________________________________    CC: No chief complaint on file.    HPI:  Mr. Breezy Tijerina is a(n) 77 year old male who presents today as a return patient for FBSE. Hx of NMSC x 2, most recent o the L hand and he reports it has healed very well.    Last seen 8/15/22 by   Nghia for Mohs surgery of an SCC on the left dorsal hand.    No areas of concern     Patient is otherwise feeling well, without additional skin concerns.    Labs Reviewed:  N/A    Physical Exam:  Vitals: There were no vitals taken for this visit.  SKIN: Full skin, which includes the head/face, both arms, chest, back, abdomen,both legs, genitalia and/or groin buttocks, digits and/or nails, was examined.  - York type II, < 100 nevi   - L posterior thigh - 4mm brown asymetrical macule  - There are dome shaped bright red papules on the trunk and extremities.   - Multiple regular brown pigmented macules and papules are identified on the trunk and extremities  - Scattered brown macules on sun exposed areas.  - There are waxy stuck on tan to brown papules on the trunk and extremities.    - There is no erythema, telangectasias, nodularity, or pigmentation on the L hand and temple..   - No other lesions of concern on areas examined.     Medications:  Current Outpatient Medications   Medication     lisinopril (ZESTRIL) 40 MG tablet     propranolol SR BEADS (INDREAL XL) 80 MG 24 hr capsule     simvastatin (ZOCOR) 80 MG tablet     No current facility-administered medications for this visit.      Past Medical History:   Patient Active Problem List   Diagnosis     Balance problems     Benign essential hypertension     Hypercholesterolemia     Alcohol dependence (H)     Tremor     Age-related cataract     Fatty liver, alcoholic     Class 1 obesity due to excess calories with serious comorbidity and body mass index (BMI) of 33.0 to 33.9 in adult     H/O adenomatous polyp of colon     Combined form of age-related cataract, left eye     Past Medical History:   Diagnosis Date     Age-related cataract 10/14/2022     Balance problems      Class 1 drug-induced obesity without serious comorbidity with body mass index (BMI) of 33.0 to 33.9 in adult 10/15/2022     Fatty liver, alcoholic 10/14/2022     H/O adenomatous polyp of colon  07/11/2022     HTN (hypertension)      Hypercholesterolemia 10/14/2022     Neuropathy of right peroneal nerve      Tremor 10/14/2022           Again, thank you for allowing me to participate in the care of your patient.        Sincerely,        Natividad Hassan PA-C

## 2023-02-15 NOTE — NURSING NOTE
The following medication was given:     MEDICATION:  Lidocaine with epinephrine 1% 1:[id number]  ROUTE: SQ  SITE: see procedure note  DOSE: 1ml  LOT #: [id number]  : [employer]  EXPIRATION DATE: 07/31/2024  NDC#: 68533-469-55  Was there drug waste? Yes 0.5ml  Multi-dose vial: Yes    Annette Mao  February 15, 2023

## 2023-02-20 NOTE — TELEPHONE ENCOUNTER
MEGHANN for pt letting him know that prescriptions were sent to [facility].     Luisana Ellis, COT, 3:02 PM 02/20/2023

## 2023-02-20 NOTE — TELEPHONE ENCOUNTER
[facility] Call Center    Phone Message    May a detailed message be left on voicemail: yes     Reason for Call: Medication Question or concern regarding medication   Prescription Clarification  Name of Medication: ketorolac (ACULAR) 0.5 % ophthalmic solution  moxifloxacin (VIGAMOX) 0.5 % ophthalmic solution  prednisoLONE acetate (PRED FORTE) 1 % ophthalmic suspension  Prescribing Provider: Dr Prasad   Pharmacy: [facility] [address] [place] [address] [address]   What on the order needs clarification? We sent 3 prescription to our [place] pharmacy today but it's 3x more expensive. Patient wants to know if we can send the prescription to [facility] in [place] instead. Patient can be reached at [phone]. Thank you.    Action Taken: Message routed to:  Adult Clinics: Eye p [id number]    Travel Screening: Not Applicable

## 2023-02-22 NOTE — ANESTHESIA PREPROCEDURE EVALUATION
Anesthesia Pre-Procedure Evaluation    Patient: Breezy Tijerina   MRN: [id number] : 1945        Procedure : Procedure(s):  PHACOEMULSIFICATION, CATARACT, WITH INTRAOCULAR LENS IMPLANT          Past Medical History:   Diagnosis Date     Age-related cataract 10/14/2022     Balance problems      Class 1 drug-induced obesity without serious comorbidity with body mass index (BMI) of 33.0 to 33.9 in adult 10/15/2022     Fatty liver, alcoholic 10/14/2022     H/O adenomatous polyp of colon 2022     HTN (hypertension)      Hypercholesterolemia 10/14/2022     Neuropathy of right peroneal nerve      Tremor 10/14/2022      Past Surgical History:   Procedure Laterality Date     COLONOSCOPY       COLONOSCOPY N/A 2022    Procedure: COLONOSCOPY, FLEXIBLE, WITH LESION REMOVAL USING SNARE;  Surgeon: Ernesto Cervantes MD;  Location: [facility] OR     COLONOSCOPY WITH CO2 INSUFFLATION N/A 2022    Procedure: COLONOSCOPY, WITH CO2 INSUFFLATION;  Surgeon: Ernesto Cervantes MD;  Location: [facility] OR     HIP ARTHROSCOPY W/ LABRAL REPAIR      right shoulder     JOINT REPLACEMENT      both hips and both knees     PHACOEMULSIFICATION CLEAR CORNEA WITH TORIC INTRAOCULAR LENS IMPLANT Right 2023    Procedure: RIGHT PHACOEMULSIFICATION, CATARACT, WITH INTRAOCULAR LENS IMPLANT, TORIC LENS;  Surgeon: Huy Prasad MD;  Location: [facility] OR      No Known Allergies   Social History     Tobacco Use     Smoking status: Former     Packs/day: 1.00     Years: 6.00     Pack years: 6.00     Types: Cigarettes     Quit date:      Years since quittin.1     Passive exposure: Never     Smokeless tobacco: Never   Substance Use Topics     Alcohol use: Yes     Alcohol/week: 21.0 standard drinks     Types: 21 Standard drinks or equivalent per week      Wt Readings from Last 1 Encounters:   23 115.2 kg (254 lb)        Anesthesia Evaluation   Pt has had prior anesthetic. Type: MAC and General.    No history of  anesthetic complications       ROS/MED HX  ENT/Pulmonary:    (-) tobacco use and asthma   Neurologic: Comment: Tremor   (-) no seizures and no CVA   Cardiovascular:     (+) Dyslipidemia hypertension-----    METS/Exercise Tolerance: >4 METS    Hematologic:       Musculoskeletal:       GI/Hepatic: Comment: Fatty liver    (+) liver disease,  (-) GERD   Renal/Genitourinary:       Endo:     (+) Obesity,     Psychiatric/Substance Use: Comment: Hx/o EtOH abuse    (+) alcohol abuse     Infectious Disease:       Malignancy:       Other:            Physical Exam    Airway  airway exam normal      Mallampati: I   TM distance: > 3 FB   Neck ROM: full   Mouth opening: > 3 cm    Respiratory Devices and Support         Dental       (+) Minor Abnormalities - some fillings, tiny chips      Cardiovascular   cardiovascular exam normal       Rhythm and rate: regular and normal     Pulmonary   pulmonary exam normal                OUTSIDE LABS:  CBC: No results found for: WBC, HGB, HCT, PLT  BMP:   Lab Results   Component Value Date     10/24/2022    POTASSIUM 4.1 10/24/2022    CHLORIDE 109 10/24/2022    CO2 25 10/24/2022    BUN 16 10/24/2022    CR 0.84 10/24/2022     (H) 10/24/2022     COAGS: No results found for: PTT, INR, FIBR  POC: No results found for: BGM, HCG, HCGS  HEPATIC:   Lab Results   Component Value Date    ALBUMIN 3.9 10/24/2022    PROTTOTAL 7.5 10/24/2022    ALT 20 10/24/2022    AST 19 10/24/2022    ALKPHOS 68 10/24/2022    BILITOTAL 0.9 10/24/2022     OTHER:   Lab Results   Component Value Date    TEODORA 9.3 10/24/2022       Anesthesia Plan    ASA Status:  3   NPO Status:  NPO Appropriate    Anesthesia Type: MAC.     - Reason for MAC: straight local not clinically adequate   Induction: N/a.   Maintenance: N/A.        Consents    Anesthesia Plan(s) and associated risks, benefits, and realistic alternatives discussed. Questions answered and patient/representative(s) expressed understanding.    - Discussed:     -  Discussed with:  Patient      - Extended Intubation/Ventilatory Support Discussed: No.      - Patient is DNR/DNI Status: No    Use of blood products discussed: No .     Postoperative Care    Pain management: Oral pain medications.   PONV prophylaxis: Ondansetron (or other 5HT-3)     Comments:    Other Comments: MAC, sedation, GA as back up, standard ASA monitors  All pertinent results and records reviewed, risks, included but not limited to hypoventilation, hypoxemia, laryngo/bronchospasm, N/V, intraoperative awareness due to sedation only d/w patient, all questions, concerns addressed            Qasim Locke MD

## 2023-02-23 NOTE — ANESTHESIA CARE TRANSFER NOTE
Patient: Breezy Tijerina    Procedure: Procedure(s):  PHACOEMULSIFICATION, CATARACT, WITH INTRAOCULAR LENS IMPLANT       Diagnosis: Combined form of age-related cataract, left eye [H25.812]  Diagnosis Additional Information: No value filed.    Anesthesia Type:   MAC     Note:    Oropharynx: oropharynx clear of all foreign objects  Level of Consciousness: awake  Oxygen Supplementation: room air    Independent Airway: airway patency satisfactory and stable  Dentition: dentition unchanged  Vital Signs Stable: post-procedure vital signs reviewed and stable  Report to RN Given: handoff report given  Patient transferred to: Phase II    Handoff Report: Identifed the Patient, Identified the Reponsible Provider, Reviewed the pertinent medical history, Discussed the surgical course, Reviewed Intra-OP anesthesia mangement and issues during anesthesia, Set expectations for post-procedure period and Allowed opportunity for questions and acknowledgement of understanding      Vitals:  Vitals Value Taken Time   /91 02/23/23 0906   Temp     Pulse     Resp 16 02/23/23 0906   SpO2 96 % 02/23/23 0906       Electronically Signed By: ROXY Almeida CRNA  February 23, 2023  9:09 AM

## 2023-02-23 NOTE — PROGRESS NOTES
Review of systems for the eyes was negative other than the pertinent positives/negatives listed in the HPI.      Assessment & Plan    HPI:  Breezy Tijerina is a 78 year old male HTN, HLD, myopia with astigmatism and presbyopia who presents for Postoperative day 0 left eye   POHx: myopia with astigmatism and presbyopia  PMHx: HTN, HLD  Current Medications: ketorolac (ACULAR) 0.5 % ophthalmic solution, Place 1 drop Into the left eye 4 times daily Start 2 days prior to surgery four times a day, after surgery: Four times a day x 1 week, three times a day x 1 week, twice a day x 1 week, daily x 1 week then stop  lisinopril (ZESTRIL) 40 MG tablet, Take 1 tablet (40 mg) by mouth daily  moxifloxacin (VIGAMOX) 0.5 % ophthalmic solution, Place 1 drop Into the left eye 4 times daily Start 2 days prior to surgery four times a day. After Surgery: four times a day  X 1 week  prednisoLONE acetate (PRED FORTE) 1 % ophthalmic suspension, Place 1-2 drops Into the left eye 4 times daily After surgery: Four times a day x 1 week, three times a day x 1 week, twice a day x 1 week, daily x 1 week then stop  propranolol SR BEADS (INDREAL XL) 80 MG 24 hr capsule, Take 1 capsule (80 mg) by mouth daily  simvastatin (ZOCOR) 80 MG tablet, Take 0.5 tablets (40 mg) by mouth At Bedtime    [COMPLETED] ketorolac (ACULAR) 0.5 % ophthalmic solution 1 drop  lactated ringers infusion  lactated ringers infusion  lactated ringers infusion  lidocaine (LMX4) kit  lidocaine (LMX4) kit  lidocaine 1 % 0.1-1 mL  lidocaine 1 % 0.1-1 mL  [COMPLETED] moxifloxacin (VIGAMOX) 0.5 % ophthalmic solution 1 drop  proparacaine (ALCAINE) 0.5 % ophthalmic solution 1 drop  sodium chloride (PF) 0.9% PF flush 3 mL  sodium chloride (PF) 0.9% PF flush 3 mL  sodium chloride (PF) 0.9% PF flush 3 mL  sodium chloride (PF) 0.9% PF flush 3 mL  [COMPLETED] tropicamide 1 %-cyclopentolate 1 %-phenylephrine 2.5% 1-1-2.5 % ophthalmic solution 1 drop      FHx: denies family history of  ocular conditions   PSHx: Cataract extraction/iol Prasad right eye 01/31/23, Prasad left eye 02/23/23       Current Eye Medications:  Moxifloxacin/pred forte/ketorolac three times a day right eye     Assessment & Plan:  Pseudophakia, left eye, day 0  Prasad left eye 02/23/23   Doing well  Keep patch in place at night for 7 days  Start post-operative drops and taper according to instructions  Post-operative do's and don'ts reviewed, questions answered    Recheck 1 week    Pseudophakia, right eye  Prasad right eye 01/31/23   Continue drop taper    (B02.22) Trigeminal herpes zoster   No prior ocular involvement    (H52.13,  H52.203,  H52.4) Myopia of both eyes with astigmatism and presbyopia  Hold pending Cataract extraction/iol    Return for as scheduled.        Huy Prasad MD     Attending Physician Attestation:  Complete documentation of historical and exam elements from today's encounter can be found in the full encounter summary report (not reduplicated in this progress note).  I personally obtained the chief complaint(s) and history of present illness.  I confirmed and edited as necessary the review of systems, past medical/surgical history, family history, social history, and examination findings as documented by others; and I examined the patient myself.  I personally reviewed the relevant tests, images, and reports as documented above.  I formulated and edited as necessary the assessment and plan and discussed the findings and management plan with the patient and family. - Huy Prasad MD     ;

## 2023-02-23 NOTE — DISCHARGE INSTRUCTIONS
[facility]   Adult Discharge Orders & Instructions     For 24 hours after surgery    Get plenty of rest.  A responsible adult must stay with you for at least 24 hours after you leave the hospital.   Do not drive or use heavy equipment.  If you have weakness or tingling, don't drive or use heavy equipment until this feeling goes away.  Do not drink alcohol.  Avoid strenuous or risky activities.  Ask for help when climbing stairs.   You may feel lightheaded.  IF so, sit for a few minutes before standing.  Have someone help you get up.   If you have nausea (feel sick to your stomach): Drink only clear liquids such as apple juice, ginger ale, broth or 7-Up.  Rest may also help.  Be sure to drink enough fluids.  Move to a regular diet as you feel able.  You may have a slight fever. Call the doctor if your fever is over 100 F (37.7 C) (taken under the tongue) or lasts longer than 24 hours.  You may have a dry mouth, a sore throat, muscle aches or trouble sleeping.  These should go away after 24 hours.  Do not make important or legal decisions.     Call your doctor for any of the followin.  Signs of infection (fever, growing tenderness at the surgery site, a large amount of drainage or bleeding, severe pain, foul-smelling drainage, redness, swelling).    2. It has been over 8 to 10 hours since surgery and you are still not able to urinate (pass water).    3.  Headache for over 24 hours.    4.  Numbness, tingling or weakness the day after surgery (if you had spinal anesthesia).                  5. Signs of Covid-19 infection (temperature over 100 degrees, shortness of breath, cough, loss of taste/smell, generalized body aches, persistent headache,                  chills, sore throat, nausea/vomiting/diarrhea).    For questions or concerns regarding your procedure, please contact Dr. Prasad at [phone].

## 2023-02-23 NOTE — OP NOTE
PREOPERATIVE DIAGNOSIS:   1. Combined form of age-related cataract, left eye     Left eye   POSTOPERATIVE DIAGNOSIS: Same   PROCEDURES:   1. Cataract extraction with intraocular lens implant Left eye.  SURGEON: Huy Prasad M.D.  INDICATIONS: The patient Breezy Tijerina presented to the eye clinic with decreased vision secondary to cataract in the Left eye. The risks, benefits and alternatives to cataract extraction were discussed. The patient elected to proceed. All questions were answered to the patient's satisfaction.   DESCRIPTION OF PROCEDURE:   Prior to the procedure, appropriate cardiac and respiratory monitors were applied to the patient.  In the pre-operative holding area, a drop of topical tetracaine was placed in the operative eye.  The patient was brought to the operating room.  With adequate anesthesia, the Left eye was prepped and draped in the usual sterile fashion. A lid speculum was placed, and the operating microscope was rotated into position. A surgical pause was carried out to identify with all members of the surgical team the correct surgical site. A paracentesis was created.  Through this limbal paracentesis, the anterior chamber was filled with preservative-free lidocaine with epi shugarcaine followed by viscoelastic.  A temporal wound was created at the limbus using a 2.6 mm blade. A capsulorrhexis was initiated using a bent 25-gauge needle and was completed in continuous and circular fashion using the capsulorrhexis forceps. The lens nucleus was hydrodissected using balanced salt solution.  The lens nucleus was rotated and removed using phacoemulsification in a divide and conquer technique.  Residual cortical material was removed using irrigation-aspiration.  The capsular bag was reinflated to its maximal extent with cohesive viscoelastic.  A 18.5 diopter ZCB00 was inserted into the capsular bag.  The lens power selected was reviewed using the intraocular lens power measurements that  were obtained preoperatively to confirm that the correct lens was selected for the desired post-operative refractive state. The residual viscoelastic was removed in its entirety, the wound were hydrated and found to be self-sealing.  Intracameral moxifloxacin was administered. Tactile pressure was confirmed to be in a normal range.  The lid speculum was removed and a shield was applied.  The patient tolerated the procedure well, and there were no complications.    PLAN: The patient will be discharged to home and will follow up today  EBL:  None  Complications:  None  Implant Name Type Inv. Item Serial No.  Lot No. LRB No. Used Action   EYE IMP IOL SERGIO PCL TECNIS ZCB00 18.5 - [id number] Lens/Eye Implant EYE IMP IOL SERGIO PCL TECNIS ZCB00 18.5 [id number] [employer]  Left 1 Implanted

## 2023-02-23 NOTE — ANESTHESIA POSTPROCEDURE EVALUATION
Patient: Breezy Tijerina    Procedure: Procedure(s):  PHACOEMULSIFICATION, CATARACT, WITH INTRAOCULAR LENS IMPLANT       Anesthesia Type:  MAC    Note:  Disposition: Outpatient   Postop Pain Control: Uneventful            Sign Out: Well controlled pain   PONV: No   Neuro/Psych: Uneventful            Sign Out: Acceptable/Baseline neuro status   Airway/Respiratory: Uneventful            Sign Out: Acceptable/Baseline resp. status   CV/Hemodynamics: Uneventful            Sign Out: Acceptable CV status; No obvious hypovolemia; No obvious fluid overload   Other NRE: NONE   DID A NON-ROUTINE EVENT OCCUR?            Last vitals:  Vitals Value Taken Time   /88 02/23/23 0923   Temp 36.9  C (98.5  F) 02/23/23 0906   Pulse     Resp 16 02/23/23 0923   SpO2 96 % 02/23/23 0923       Electronically Signed By: Qasim Locke MD  February 23, 2023  9:43 AM

## 2023-03-10 NOTE — TELEPHONE ENCOUNTER
Left Voicemail (1st Attempt) for the patient to call back and schedule the following:    Appointment type: return  Provider: dr. Prasad   Return date: 3/10/2024  Specialty phone number: [phone]   Additonal Notes: Return in about 1 year (around 3/10/2024) for Annual Visit Dr. Cr.    Karen benoit Procedure   Orthopedics, Podiatry, Sports Medicine, Ent ,Eye , Audiology, Adult Endocrine & Diabetes, Nutrition & Medication Therapy Management Specialties   [facility]

## 2023-03-10 NOTE — NURSING NOTE
Chief Complaints and History of Present Illnesses   Patient presents with     Post Op (Ophthalmology) Left Eye       Chief Complaint(s) and History of Present Illness(es)     Post Op (Ophthalmology) Left Eye           Comments    Here for 2 week post op LEFT PHACOEMULSIFICATION, CATARACT, WITH INTRAOCULAR LENS IMPLANT   He notes his vision is good. He is still wearing his old glasses and feels this is helping his vision.  He denies pain/discomfort in either eye.   Denies floaters/flashes of lights.                  LAURA Llanos  8:40 AM 03/10/2023

## 2023-03-10 NOTE — PROGRESS NOTES
HPI    Here for 2 week post op LEFT PHACOEMULSIFICATION, CATARACT, WITH INTRAOCULAR LENS IMPLANT   He notes his vision is good. He is still wearing his old glasses and feels this is helping his vision.  He denies pain/discomfort in either eye.   Denies floaters/flashes of lights.   Last edited by Jing Arevalo on 3/10/2023  8:39 AM.         Review of systems for the eyes was negative other than the pertinent positives/negatives listed in the HPI.      Assessment & Plan    HPI:  Breezy Tijerina is a 78 year old male HTN, HLD, myopia with astigmatism and presbyopia who presents for final post op    POHx: myopia with astigmatism and presbyopia  PMHx: HTN, HLD  Current Medications: ketorolac (ACULAR) 0.5 % ophthalmic solution, Place 1 drop Into the left eye 4 times daily Start 2 days prior to surgery four times a day, after surgery: Four times a day x 1 week, three times a day x 1 week, twice a day x 1 week, daily x 1 week then stop  lisinopril (ZESTRIL) 40 MG tablet, Take 1 tablet (40 mg) by mouth daily  moxifloxacin (VIGAMOX) 0.5 % ophthalmic solution, Place 1 drop Into the left eye 4 times daily Start 2 days prior to surgery four times a day. After Surgery: four times a day  X 1 week  prednisoLONE acetate (PRED FORTE) 1 % ophthalmic suspension, Place 1-2 drops Into the left eye 4 times daily After surgery: Four times a day x 1 week, three times a day x 1 week, twice a day x 1 week, daily x 1 week then stop  propranolol SR BEADS (INDREAL XL) 80 MG 24 hr capsule, Take 1 capsule (80 mg) by mouth daily  simvastatin (ZOCOR) 80 MG tablet, Take 0.5 tablets (40 mg) by mouth At Bedtime    No current facility-administered medications on file prior to visit.    FHx: denies family history of ocular conditions   PSHx: Cataract extraction/iol Prasad right eye 01/31/23, Prasad left eye 02/23/23       Current Eye Medications:  pred forte/ketorolac one time a day left eye    Assessment & Plan:  (Z96.1) Pseudophakia of both eyes   (primary encounter diagnosis)  Prasad left eye 02/23/23  Prasad right eye 01/31/23  Continue drop taper    (B02.22) Trigeminal herpes zoster   No prior ocular involvement    (H52.13) Myopia of both eyes  (H52.4) Presbyopia  Patient has minimal change in myopia but a copy of today's glasses prescription was given.  The patient may wish to update the glasses if the lenses are scratched or the frames are too small.  Patient desires to remove glasses for near vision    Return in about 1 year (around 3/10/2024) for Annual Visit Dr. Cr.        Huy Prasad MD     Attending Physician Attestation:  Complete documentation of historical and exam elements from today's encounter can be found in the full encounter summary report (not reduplicated in this progress note).  I personally obtained the chief complaint(s) and history of present illness.  I confirmed and edited as necessary the review of systems, past medical/surgical history, family history, social history, and examination findings as documented by others; and I examined the patient myself.  I personally reviewed the relevant tests, images, and reports as documented above.  I formulated and edited as necessary the assessment and plan and discussed the findings and management plan with the patient and family. - Huy Prasad MD     ;

## 2023-03-13 NOTE — ADDENDUM NOTE
Addendum  created 03/13/23 1704 by Qasim Locke MD    Attestation recorded in Intraprocedure, Intraprocedure Attestations filed

## 2023-09-11 NOTE — TELEPHONE ENCOUNTER
Left Voicemail (2nd Attempt) for the patient to call back and schedule the following:    Appointment type: return  Provider: dr. cr  Return date: 3/10/2024  Specialty phone number: [phone]   Additonal Notes: Return in about 1 year (around 3/10/2024) for Annual Visit Dr. Cr.     Karen benoit Procedure   Orthopedics, Podiatry, Sports Medicine, Ent ,Eye , Audiology, Adult Endocrine & Diabetes, Nutrition & Medication Therapy Management Specialties   [facility]

## 2025-01-14 NOTE — PROGRESS NOTES
[facility] Dermatology Note  Encounter Date: Feb 15, 2023  Office Visit     Dermatology Problem List:   0. NUB - L posterior thigh - s/p bx 2/15/23  1. 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 Herpes Zoster - s/p valtrex  ____________________________________________    Assessment & Plan:    # Neoplasm of uncertain behavior (D49.2) on the L posterior thigh. The differential diagnosis includes DN vs MM vs other  - shave bx - see below    # History of nonmelanoma skin cancer, no clinical evidence of recurrence..   - continue skin exams every 6mo     # Multiple clinically benign nevi on the trunk and extremities. No treatment is necessary at this time unless the lesion changes or becomes symptomatic.    - ABCDEs of melanoma were discussed and self skin checks were advised.  - Sunscreen: Apply 20 minutes prior to going outdoors and reapply every two hours, when wet or sweating. We recommend using an SPF 30 or higher, and to use one that is water resistant.       # Seborrheic keratosis, non irritated on the trunk and extremities. Explained to patient benign nature of lesion. No treatment is necessary at this time unless the lesion changes or becomes symptomatic.     - Monitor for changes.    # Cherry Angiomas - trunk and extremities. Explained to patient benign nature of lesion. No treatment is necessary at this time unless the lesion changes or becomes symptomatic.      # Solar lentigines on the sun exposed skin areas. Benign nature was discussed. No further intervention required at this time.    - Sun precaution was advised including the use of sun screens of SPF 30 or higher, sun protective clothing, and avoidance of tanning beds.      Procedures Performed:   Shave bx -After discussion of benefits and risks including but not limited to bleeding, infection, scar, incomplete removal, recurrence, and non-diagnostic biopsy, written consent and  photographs were obtained. The area was cleaned with isopropyl alcohol. 0.1mL of 1% lidocaine with epinephrine was injected to obtain adequate anesthesia of the lesion on the L posterior thigh. A shave biopsy was performed. Hemostasis was achieved with aluminium chloride. Vaseline and a sterile dressing were applied. The patient tolerated the procedure and no complications were noted. The patient was provided with verbal and written post care instructions.       Follow-up: 6 month(s) in-person, or earlier for new or changing lesions    Staff and Scribe:     Scribe Disclosure:   I, Issa Granados, am serving as a scribe to document services personally performed by this physician, Natividad Hassan PA-C, based on data collection and the provider's statements to me.   Provider Disclosure:   The documentation recorded by the scribe accurately reflects the services I personally performed and the decisions made by me.    All risks, benefits and alternatives were discussed with patient.  Patient is in agreement and understands the assessment and plan.  All questions were answered.    Natividad Hassan PA-C, [facility]  [facility]: Phone: [phone], Fax: [phone]  [facility]: Phone: [phone],  Fax: [phone]  [facility]: Phone: [phone], Fax: [phone]  ____________________________________________    CC: No chief complaint on file.    HPI:  Mr. Breezy Tijerina is a(n) 77 year old male who presents today as a return patient for FBSE. Hx of NMSC x 2, most recent o the L hand and he reports it has healed very well.    Last seen 8/15/22 by Dr. Agrawal for Mohs surgery of an SCC on the left dorsal hand.    No areas of concern     Patient is otherwise feeling well, without additional skin concerns.    Labs Reviewed:  N/A    Physical Exam:  Vitals: There were no vitals taken for this visit.  SKIN: Full skin,  which includes the head/face, both arms, chest, back, abdomen,both legs, genitalia and/or groin buttocks, digits and/or nails, was examined.  - York type II, < 100 nevi   - L posterior thigh - 4mm brown asymetrical macule  - There are dome shaped bright red papules on the trunk and extremities.   - Multiple regular brown pigmented macules and papules are identified on the trunk and extremities  - Scattered brown macules on sun exposed areas.  - There are waxy stuck on tan to brown papules on the trunk and extremities.    - There is no erythema, telangectasias, nodularity, or pigmentation on the L hand and temple..   - No other lesions of concern on areas examined.     Medications:  Current Outpatient Medications   Medication     lisinopril (ZESTRIL) 40 MG tablet     propranolol SR BEADS (INDREAL XL) 80 MG 24 hr capsule     simvastatin (ZOCOR) 80 MG tablet     No current facility-administered medications for this visit.      Past Medical History:   Patient Active Problem List   Diagnosis     Balance problems     Benign essential hypertension     Hypercholesterolemia     Alcohol dependence (H)     Tremor     Age-related cataract     Fatty liver, alcoholic     Class 1 obesity due to excess calories with serious comorbidity and body mass index (BMI) of 33.0 to 33.9 in adult     H/O adenomatous polyp of colon     Combined form of age-related cataract, left eye     Past Medical History:   Diagnosis Date     Age-related cataract 10/14/2022     Balance problems      Class 1 drug-induced obesity without serious comorbidity with body mass index (BMI) of 33.0 to 33.9 in adult 10/15/2022     Fatty liver, alcoholic 10/14/2022     H/O adenomatous polyp of colon 07/11/2022     HTN (hypertension)      Hypercholesterolemia 10/14/2022     Neuropathy of right peroneal nerve      Tremor 10/14/2022        [Negative] : Heme/Lymph [Back Pain] : back pain [[id number]] : see hpi [de-identified] : concussion

## (undated) DEVICE — GLOVE PROTEXIS W/NEU-THERA 7.5  2D73TE75

## (undated) DEVICE — EYE PACK CUSTOM CATARACT AS12127-01

## (undated) DEVICE — SOL WATER IRRIG 1000ML BOTTLE 07139-09

## (undated) RX ORDER — FENTANYL CITRATE 50 UG/ML
INJECTION, SOLUTION INTRAMUSCULAR; INTRAVENOUS
Status: DISPENSED
Start: 2023-01-01

## (undated) RX ORDER — SIMETHICONE 40MG/0.6ML
SUSPENSION, DROPS(FINAL DOSAGE FORM)(ML) ORAL
Status: DISPENSED
Start: 2022-07-11

## (undated) RX ORDER — FENTANYL CITRATE 50 UG/ML
INJECTION, SOLUTION INTRAMUSCULAR; INTRAVENOUS
Status: DISPENSED
Start: 2022-07-11